# Patient Record
Sex: MALE | Race: WHITE | NOT HISPANIC OR LATINO | Employment: OTHER | URBAN - NONMETROPOLITAN AREA
[De-identification: names, ages, dates, MRNs, and addresses within clinical notes are randomized per-mention and may not be internally consistent; named-entity substitution may affect disease eponyms.]

---

## 2020-12-21 ENCOUNTER — APPOINTMENT (OUTPATIENT)
Dept: GENERAL RADIOLOGY | Facility: HOSPITAL | Age: 65
End: 2020-12-21

## 2020-12-21 ENCOUNTER — HOSPITAL ENCOUNTER (EMERGENCY)
Facility: HOSPITAL | Age: 65
Discharge: ANOTHER HOSPITAL | End: 2020-12-22
Attending: EMERGENCY MEDICINE | Admitting: EMERGENCY MEDICINE

## 2020-12-21 DIAGNOSIS — E13.10 DIABETIC KETOACIDOSIS WITHOUT COMA ASSOCIATED WITH OTHER SPECIFIED DIABETES MELLITUS (HCC): Primary | ICD-10-CM

## 2020-12-21 DIAGNOSIS — J18.9 PNEUMONIA OF BOTH LOWER LOBES DUE TO INFECTIOUS ORGANISM: ICD-10-CM

## 2020-12-21 LAB
A-A DO2: 129.3 MMHG (ref 0–300)
A-A DO2: 41.6 MMHG (ref 0–300)
A-A DO2: 49.6 MMHG (ref 0–300)
A-A DO2: 61.6 MMHG (ref 0–300)
ACETONE BLD QL: ABNORMAL
ALBUMIN SERPL-MCNC: 4.61 G/DL (ref 3.5–5.2)
ALBUMIN/GLOB SERPL: 1.5 G/DL
ALP SERPL-CCNC: 122 U/L (ref 39–117)
ALT SERPL W P-5'-P-CCNC: 15 U/L (ref 1–41)
AMYLASE SERPL-CCNC: 228 U/L (ref 28–100)
ANION GAP SERPL CALCULATED.3IONS-SCNC: 18.6 MMOL/L (ref 5–15)
ANION GAP SERPL CALCULATED.3IONS-SCNC: 19.1 MMOL/L (ref 5–15)
ANION GAP SERPL CALCULATED.3IONS-SCNC: 30.9 MMOL/L (ref 5–15)
ANION GAP SERPL CALCULATED.3IONS-SCNC: 38.2 MMOL/L (ref 5–15)
ARTERIAL PATENCY WRIST A: ABNORMAL
ARTERIAL PATENCY WRIST A: ABNORMAL
ARTERIAL PATENCY WRIST A: POSITIVE
ARTERIAL PATENCY WRIST A: POSITIVE
AST SERPL-CCNC: 19 U/L (ref 1–40)
ATMOSPHERIC PRESS: 722 MMHG
ATMOSPHERIC PRESS: 724 MMHG
ATMOSPHERIC PRESS: 725 MMHG
ATMOSPHERIC PRESS: 725 MMHG
BACTERIA UR QL AUTO: ABNORMAL /HPF
BASE EXCESS BLDA CALC-SCNC: -15.7 MMOL/L (ref 0–2)
BASE EXCESS BLDA CALC-SCNC: -25.2 MMOL/L (ref 0–2)
BASE EXCESS BLDA CALC-SCNC: -27.7 MMOL/L (ref 0–2)
BASE EXCESS BLDA CALC-SCNC: -30.8 MMOL/L (ref 0–2)
BASOPHILS # BLD AUTO: 0.1 10*3/MM3 (ref 0–0.2)
BASOPHILS NFR BLD AUTO: 1 % (ref 0–1.5)
BDY SITE: ABNORMAL
BILIRUB SERPL-MCNC: 0.3 MG/DL (ref 0–1.2)
BILIRUB UR QL STRIP: NEGATIVE
BODY TEMPERATURE: 0 C
BUN SERPL-MCNC: 50 MG/DL (ref 8–23)
BUN SERPL-MCNC: 50 MG/DL (ref 8–23)
BUN SERPL-MCNC: 51 MG/DL (ref 8–23)
BUN SERPL-MCNC: 53 MG/DL (ref 8–23)
BUN/CREAT SERPL: 14.5 (ref 7–25)
BUN/CREAT SERPL: 15 (ref 7–25)
BUN/CREAT SERPL: 16.4 (ref 7–25)
BUN/CREAT SERPL: 16.6 (ref 7–25)
CALCIUM SPEC-SCNC: 7.4 MG/DL (ref 8.6–10.5)
CALCIUM SPEC-SCNC: 7.7 MG/DL (ref 8.6–10.5)
CALCIUM SPEC-SCNC: 7.9 MG/DL (ref 8.6–10.5)
CALCIUM SPEC-SCNC: 8.6 MG/DL (ref 8.6–10.5)
CHLORIDE SERPL-SCNC: 105 MMOL/L (ref 98–107)
CHLORIDE SERPL-SCNC: 115 MMOL/L (ref 98–107)
CHLORIDE SERPL-SCNC: 117 MMOL/L (ref 98–107)
CHLORIDE SERPL-SCNC: 90 MMOL/L (ref 98–107)
CLARITY UR: ABNORMAL
CO2 BLDA-SCNC: 2.7 MMOL/L (ref 22–33)
CO2 BLDA-SCNC: 3.5 MMOL/L (ref 22–33)
CO2 BLDA-SCNC: 4.8 MMOL/L (ref 22–33)
CO2 BLDA-SCNC: 9.4 MMOL/L (ref 22–33)
CO2 SERPL-SCNC: 12.9 MMOL/L (ref 22–29)
CO2 SERPL-SCNC: 4.8 MMOL/L (ref 22–29)
CO2 SERPL-SCNC: 5.1 MMOL/L (ref 22–29)
CO2 SERPL-SCNC: 8.4 MMOL/L (ref 22–29)
COHGB MFR BLD: 0.9 % (ref 0–5)
COHGB MFR BLD: 0.9 % (ref 0–5)
COHGB MFR BLD: 1 % (ref 0–5)
COHGB MFR BLD: 1.1 % (ref 0–5)
COLOR UR: YELLOW
CREAT SERPL-MCNC: 3.04 MG/DL (ref 0.76–1.27)
CREAT SERPL-MCNC: 3.08 MG/DL (ref 0.76–1.27)
CREAT SERPL-MCNC: 3.34 MG/DL (ref 0.76–1.27)
CREAT SERPL-MCNC: 3.66 MG/DL (ref 0.76–1.27)
CRP SERPL-MCNC: 5.11 MG/DL (ref 0–0.5)
D-LACTATE SERPL-SCNC: 2.2 MMOL/L (ref 0.5–2)
D-LACTATE SERPL-SCNC: 3.4 MMOL/L (ref 0.5–2)
DEPRECATED RDW RBC AUTO: 48.3 FL (ref 37–54)
EOSINOPHIL # BLD AUTO: 0.04 10*3/MM3 (ref 0–0.4)
EOSINOPHIL NFR BLD AUTO: 0.4 % (ref 0.3–6.2)
ERYTHROCYTE [DISTWIDTH] IN BLOOD BY AUTOMATED COUNT: 13 % (ref 12.3–15.4)
FLUAV RNA RESP QL NAA+PROBE: NOT DETECTED
FLUBV RNA RESP QL NAA+PROBE: NOT DETECTED
GAS FLOW AIRWAY: 2 LPM
GFR SERPL CREATININE-BSD FRML MDRD: 17 ML/MIN/1.73
GFR SERPL CREATININE-BSD FRML MDRD: 19 ML/MIN/1.73
GFR SERPL CREATININE-BSD FRML MDRD: 21 ML/MIN/1.73
GFR SERPL CREATININE-BSD FRML MDRD: 21 ML/MIN/1.73
GLOBULIN UR ELPH-MCNC: 3 GM/DL
GLUCOSE BLDC GLUCOMTR-MCNC: 132 MG/DL (ref 70–130)
GLUCOSE BLDC GLUCOMTR-MCNC: 142 MG/DL (ref 70–130)
GLUCOSE BLDC GLUCOMTR-MCNC: 172 MG/DL (ref 70–130)
GLUCOSE BLDC GLUCOMTR-MCNC: 220 MG/DL (ref 70–130)
GLUCOSE BLDC GLUCOMTR-MCNC: 235 MG/DL (ref 70–130)
GLUCOSE BLDC GLUCOMTR-MCNC: 253 MG/DL (ref 70–130)
GLUCOSE BLDC GLUCOMTR-MCNC: 267 MG/DL (ref 70–130)
GLUCOSE BLDC GLUCOMTR-MCNC: 393 MG/DL (ref 70–130)
GLUCOSE BLDC GLUCOMTR-MCNC: 394 MG/DL (ref 70–130)
GLUCOSE BLDC GLUCOMTR-MCNC: 496 MG/DL (ref 70–130)
GLUCOSE BLDC GLUCOMTR-MCNC: 596 MG/DL (ref 70–130)
GLUCOSE BLDC GLUCOMTR-MCNC: 89 MG/DL (ref 70–130)
GLUCOSE BLDC GLUCOMTR-MCNC: >599 MG/DL (ref 70–130)
GLUCOSE SERPL-MCNC: 132 MG/DL (ref 65–99)
GLUCOSE SERPL-MCNC: 181 MG/DL (ref 65–99)
GLUCOSE SERPL-MCNC: 594 MG/DL (ref 65–99)
GLUCOSE SERPL-MCNC: 890 MG/DL (ref 65–99)
GLUCOSE UR STRIP-MCNC: ABNORMAL MG/DL
HCO3 BLDA-SCNC: 2.3 MMOL/L (ref 20–26)
HCO3 BLDA-SCNC: 3 MMOL/L (ref 20–26)
HCO3 BLDA-SCNC: 4.3 MMOL/L (ref 20–26)
HCO3 BLDA-SCNC: 8.8 MMOL/L (ref 20–26)
HCT VFR BLD AUTO: 47 % (ref 37.5–51)
HCT VFR BLD CALC: 37.4 % (ref 38–51)
HCT VFR BLD CALC: 37.9 % (ref 38–51)
HCT VFR BLD CALC: 39.7 % (ref 38–51)
HCT VFR BLD CALC: 43.3 % (ref 38–51)
HGB BLD-MCNC: 14 G/DL (ref 13–17.7)
HGB BLDA-MCNC: 12.2 G/DL (ref 14–18)
HGB BLDA-MCNC: 12.4 G/DL (ref 14–18)
HGB BLDA-MCNC: 13 G/DL (ref 14–18)
HGB BLDA-MCNC: 14.1 G/DL (ref 14–18)
HGB UR QL STRIP.AUTO: ABNORMAL
HOLD SPECIMEN: NORMAL
HOLD SPECIMEN: NORMAL
HYALINE CASTS UR QL AUTO: ABNORMAL /LPF
IMM GRANULOCYTES # BLD AUTO: 0.04 10*3/MM3 (ref 0–0.05)
IMM GRANULOCYTES NFR BLD AUTO: 0.4 % (ref 0–0.5)
INHALED O2 CONCENTRATION: 21 %
INHALED O2 CONCENTRATION: 28 %
KETONES UR QL STRIP: ABNORMAL
LACTATE HOLD SPECIMEN: NORMAL
LEUKOCYTE ESTERASE UR QL STRIP.AUTO: NEGATIVE
LIPASE SERPL-CCNC: 431 U/L (ref 13–60)
LYMPHOCYTES # BLD AUTO: 1.48 10*3/MM3 (ref 0.7–3.1)
LYMPHOCYTES NFR BLD AUTO: 14.9 % (ref 19.6–45.3)
Lab: ABNORMAL
MAGNESIUM SERPL-MCNC: 3.3 MG/DL (ref 1.6–2.4)
MCH RBC QN AUTO: 29.8 PG (ref 26.6–33)
MCHC RBC AUTO-ENTMCNC: 29.8 G/DL (ref 31.5–35.7)
MCV RBC AUTO: 100 FL (ref 79–97)
METHGB BLD QL: 0.3 % (ref 0–3)
METHGB BLD QL: 0.6 % (ref 0–3)
METHGB BLD QL: 0.6 % (ref 0–3)
METHGB BLD QL: 0.8 % (ref 0–3)
MODALITY: ABNORMAL
MONOCYTES # BLD AUTO: 0.58 10*3/MM3 (ref 0.1–0.9)
MONOCYTES NFR BLD AUTO: 5.8 % (ref 5–12)
NEUTROPHILS NFR BLD AUTO: 7.68 10*3/MM3 (ref 1.7–7)
NEUTROPHILS NFR BLD AUTO: 77.5 % (ref 42.7–76)
NITRITE UR QL STRIP: NEGATIVE
NOTE: ABNORMAL
NOTIFIED BY: ABNORMAL
NOTIFIED WHO: ABNORMAL
NRBC BLD AUTO-RTO: 0 /100 WBC (ref 0–0.2)
OXYHGB MFR BLDV: 79.4 % (ref 94–99)
OXYHGB MFR BLDV: 86.7 % (ref 94–99)
OXYHGB MFR BLDV: 90.9 % (ref 94–99)
OXYHGB MFR BLDV: 91 % (ref 94–99)
PCO2 BLDA: 13.9 MM HG (ref 35–45)
PCO2 BLDA: 14.2 MM HG (ref 35–45)
PCO2 BLDA: 17.1 MM HG (ref 35–45)
PCO2 BLDA: 18.9 MM HG (ref 35–45)
PCO2 TEMP ADJ BLD: ABNORMAL MM[HG]
PH BLDA: 6.83 PH UNITS (ref 7.35–7.45)
PH BLDA: 6.94 PH UNITS (ref 7.35–7.45)
PH BLDA: 7.01 PH UNITS (ref 7.35–7.45)
PH BLDA: 7.28 PH UNITS (ref 7.35–7.45)
PH UR STRIP.AUTO: <=5 [PH] (ref 5–8)
PH, TEMP CORRECTED: ABNORMAL
PHOSPHATE SERPL-MCNC: 10.9 MG/DL (ref 2.5–4.5)
PLATELET # BLD AUTO: 292 10*3/MM3 (ref 140–450)
PMV BLD AUTO: 12.5 FL (ref 6–12)
PO2 BLDA: 39.4 MM HG (ref 83–108)
PO2 BLDA: 61 MM HG (ref 83–108)
PO2 BLDA: 76.7 MM HG (ref 83–108)
PO2 BLDA: 85.1 MM HG (ref 83–108)
PO2 TEMP ADJ BLD: ABNORMAL MM[HG]
POTASSIUM SERPL-SCNC: 2.6 MMOL/L (ref 3.5–5.2)
POTASSIUM SERPL-SCNC: 2.7 MMOL/L (ref 3.5–5.2)
POTASSIUM SERPL-SCNC: 3.7 MMOL/L (ref 3.5–5.2)
POTASSIUM SERPL-SCNC: 5.2 MMOL/L (ref 3.5–5.2)
PROT SERPL-MCNC: 7.6 G/DL (ref 6–8.5)
PROT UR QL STRIP: ABNORMAL
RBC # BLD AUTO: 4.7 10*6/MM3 (ref 4.14–5.8)
RBC # UR: ABNORMAL /HPF
REF LAB TEST METHOD: ABNORMAL
SAO2 % BLDCOA: 80.5 % (ref 94–99)
SAO2 % BLDCOA: 88.1 % (ref 94–99)
SAO2 % BLDCOA: 92.3 % (ref 94–99)
SAO2 % BLDCOA: 92.6 % (ref 94–99)
SARS-COV-2 RNA RESP QL NAA+PROBE: NOT DETECTED
SODIUM SERPL-SCNC: 133 MMOL/L (ref 136–145)
SODIUM SERPL-SCNC: 141 MMOL/L (ref 136–145)
SODIUM SERPL-SCNC: 144 MMOL/L (ref 136–145)
SODIUM SERPL-SCNC: 147 MMOL/L (ref 136–145)
SP GR UR STRIP: 1.02 (ref 1–1.03)
SQUAMOUS #/AREA URNS HPF: ABNORMAL /HPF
TROPONIN T SERPL-MCNC: 0.01 NG/ML (ref 0–0.03)
TSH SERPL DL<=0.05 MIU/L-ACNC: 1.53 UIU/ML (ref 0.27–4.2)
UROBILINOGEN UR QL STRIP: ABNORMAL
VENTILATOR MODE: ABNORMAL
WBC # BLD AUTO: 9.92 10*3/MM3 (ref 3.4–10.8)
WBC UR QL AUTO: ABNORMAL /HPF
WHOLE BLOOD HOLD SPECIMEN: NORMAL
WHOLE BLOOD HOLD SPECIMEN: NORMAL

## 2020-12-21 PROCEDURE — 82009 KETONE BODYS QUAL: CPT | Performed by: EMERGENCY MEDICINE

## 2020-12-21 PROCEDURE — 99285 EMERGENCY DEPT VISIT HI MDM: CPT

## 2020-12-21 PROCEDURE — 71045 X-RAY EXAM CHEST 1 VIEW: CPT

## 2020-12-21 PROCEDURE — 71045 X-RAY EXAM CHEST 1 VIEW: CPT | Performed by: RADIOLOGY

## 2020-12-21 PROCEDURE — C9803 HOPD COVID-19 SPEC COLLECT: HCPCS

## 2020-12-21 PROCEDURE — 96367 TX/PROPH/DG ADDL SEQ IV INF: CPT

## 2020-12-21 PROCEDURE — 25010000002 PIPERACILLIN SOD-TAZOBACTAM PER 1 G: Performed by: EMERGENCY MEDICINE

## 2020-12-21 PROCEDURE — 84443 ASSAY THYROID STIM HORMONE: CPT | Performed by: EMERGENCY MEDICINE

## 2020-12-21 PROCEDURE — 83050 HGB METHEMOGLOBIN QUAN: CPT

## 2020-12-21 PROCEDURE — 96366 THER/PROPH/DIAG IV INF ADDON: CPT

## 2020-12-21 PROCEDURE — 25010000002 MIDAZOLAM PER 1MG: Performed by: EMERGENCY MEDICINE

## 2020-12-21 PROCEDURE — 86140 C-REACTIVE PROTEIN: CPT | Performed by: EMERGENCY MEDICINE

## 2020-12-21 PROCEDURE — 82962 GLUCOSE BLOOD TEST: CPT

## 2020-12-21 PROCEDURE — 87040 BLOOD CULTURE FOR BACTERIA: CPT | Performed by: EMERGENCY MEDICINE

## 2020-12-21 PROCEDURE — 82805 BLOOD GASES W/O2 SATURATION: CPT

## 2020-12-21 PROCEDURE — 93005 ELECTROCARDIOGRAM TRACING: CPT | Performed by: EMERGENCY MEDICINE

## 2020-12-21 PROCEDURE — 36415 COLL VENOUS BLD VENIPUNCTURE: CPT

## 2020-12-21 PROCEDURE — 96376 TX/PRO/DX INJ SAME DRUG ADON: CPT

## 2020-12-21 PROCEDURE — 96375 TX/PRO/DX INJ NEW DRUG ADDON: CPT

## 2020-12-21 PROCEDURE — 36600 WITHDRAWAL OF ARTERIAL BLOOD: CPT

## 2020-12-21 PROCEDURE — 87636 SARSCOV2 & INF A&B AMP PRB: CPT | Performed by: EMERGENCY MEDICINE

## 2020-12-21 PROCEDURE — 80053 COMPREHEN METABOLIC PANEL: CPT | Performed by: EMERGENCY MEDICINE

## 2020-12-21 PROCEDURE — 63710000001 INSULIN REGULAR HUMAN PER 5 UNITS: Performed by: EMERGENCY MEDICINE

## 2020-12-21 PROCEDURE — 51702 INSERT TEMP BLADDER CATH: CPT

## 2020-12-21 PROCEDURE — 82375 ASSAY CARBOXYHB QUANT: CPT

## 2020-12-21 PROCEDURE — 25010000003 POTASSIUM CHLORIDE 10 MEQ/100ML SOLUTION: Performed by: STUDENT IN AN ORGANIZED HEALTH CARE EDUCATION/TRAINING PROGRAM

## 2020-12-21 PROCEDURE — 25010000002 VANCOMYCIN 5 G RECONSTITUTED SOLUTION: Performed by: EMERGENCY MEDICINE

## 2020-12-21 PROCEDURE — 83605 ASSAY OF LACTIC ACID: CPT | Performed by: EMERGENCY MEDICINE

## 2020-12-21 PROCEDURE — 96365 THER/PROPH/DIAG IV INF INIT: CPT

## 2020-12-21 PROCEDURE — 80048 BASIC METABOLIC PNL TOTAL CA: CPT | Performed by: EMERGENCY MEDICINE

## 2020-12-21 PROCEDURE — 84100 ASSAY OF PHOSPHORUS: CPT | Performed by: EMERGENCY MEDICINE

## 2020-12-21 PROCEDURE — C1751 CATH, INF, PER/CENT/MIDLINE: HCPCS

## 2020-12-21 PROCEDURE — 83735 ASSAY OF MAGNESIUM: CPT | Performed by: EMERGENCY MEDICINE

## 2020-12-21 PROCEDURE — 80048 BASIC METABOLIC PNL TOTAL CA: CPT | Performed by: STUDENT IN AN ORGANIZED HEALTH CARE EDUCATION/TRAINING PROGRAM

## 2020-12-21 PROCEDURE — 96368 THER/DIAG CONCURRENT INF: CPT

## 2020-12-21 PROCEDURE — 81001 URINALYSIS AUTO W/SCOPE: CPT | Performed by: EMERGENCY MEDICINE

## 2020-12-21 PROCEDURE — 93010 ELECTROCARDIOGRAM REPORT: CPT | Performed by: INTERNAL MEDICINE

## 2020-12-21 PROCEDURE — 84484 ASSAY OF TROPONIN QUANT: CPT | Performed by: EMERGENCY MEDICINE

## 2020-12-21 PROCEDURE — 82150 ASSAY OF AMYLASE: CPT | Performed by: EMERGENCY MEDICINE

## 2020-12-21 PROCEDURE — 83690 ASSAY OF LIPASE: CPT | Performed by: EMERGENCY MEDICINE

## 2020-12-21 PROCEDURE — 85025 COMPLETE CBC W/AUTO DIFF WBC: CPT | Performed by: EMERGENCY MEDICINE

## 2020-12-21 RX ORDER — SODIUM CHLORIDE 0.9 % (FLUSH) 0.9 %
10 SYRINGE (ML) INJECTION AS NEEDED
Status: DISCONTINUED | OUTPATIENT
Start: 2020-12-21 | End: 2020-12-22 | Stop reason: HOSPADM

## 2020-12-21 RX ORDER — POTASSIUM CHLORIDE 7.45 MG/ML
10 INJECTION INTRAVENOUS ONCE
Status: COMPLETED | OUTPATIENT
Start: 2020-12-21 | End: 2020-12-22

## 2020-12-21 RX ORDER — DEXTROSE MONOHYDRATE 25 G/50ML
25-50 INJECTION, SOLUTION INTRAVENOUS
Status: DISCONTINUED | OUTPATIENT
Start: 2020-12-21 | End: 2020-12-22 | Stop reason: HOSPADM

## 2020-12-21 RX ORDER — GLYBURIDE 5 MG/1
5 TABLET ORAL
COMMUNITY

## 2020-12-21 RX ORDER — POTASSIUM CHLORIDE 7.45 MG/ML
10 INJECTION INTRAVENOUS ONCE
Status: COMPLETED | OUTPATIENT
Start: 2020-12-21 | End: 2020-12-21

## 2020-12-21 RX ORDER — SODIUM CHLORIDE 9 MG/ML
150 INJECTION, SOLUTION INTRAVENOUS CONTINUOUS
Status: DISCONTINUED | OUTPATIENT
Start: 2020-12-21 | End: 2020-12-22 | Stop reason: HOSPADM

## 2020-12-21 RX ORDER — MIDAZOLAM HYDROCHLORIDE 1 MG/ML
4 INJECTION INTRAMUSCULAR; INTRAVENOUS ONCE
Status: COMPLETED | OUTPATIENT
Start: 2020-12-21 | End: 2020-12-21

## 2020-12-21 RX ORDER — DEXTROSE AND SODIUM CHLORIDE 5; .9 G/100ML; G/100ML
125 INJECTION, SOLUTION INTRAVENOUS CONTINUOUS
Status: DISCONTINUED | OUTPATIENT
Start: 2020-12-21 | End: 2020-12-22 | Stop reason: HOSPADM

## 2020-12-21 RX ORDER — NOREPINEPHRINE BIT/0.9 % NACL 8 MG/250ML
.02-.3 INFUSION BOTTLE (ML) INTRAVENOUS
Status: DISCONTINUED | OUTPATIENT
Start: 2020-12-21 | End: 2020-12-22 | Stop reason: HOSPADM

## 2020-12-21 RX ORDER — EMPAGLIFLOZIN AND METFORMIN HYDROCHLORIDE 12.5; 1 MG/1; MG/1
TABLET ORAL 2 TIMES DAILY
COMMUNITY

## 2020-12-21 RX ORDER — POTASSIUM CHLORIDE 1.5 G/1.77G
40 POWDER, FOR SOLUTION ORAL ONCE
Status: COMPLETED | OUTPATIENT
Start: 2020-12-21 | End: 2020-12-21

## 2020-12-21 RX ORDER — PANTOPRAZOLE SODIUM 40 MG/1
40 TABLET, DELAYED RELEASE ORAL DAILY
COMMUNITY

## 2020-12-21 RX ORDER — POTASSIUM CHLORIDE 7.45 MG/ML
10 INJECTION INTRAVENOUS ONCE
Status: DISCONTINUED | OUTPATIENT
Start: 2020-12-21 | End: 2020-12-22 | Stop reason: HOSPADM

## 2020-12-21 RX ORDER — POTASSIUM CHLORIDE 1.5 G/1.77G
20 POWDER, FOR SOLUTION ORAL ONCE
Status: DISCONTINUED | OUTPATIENT
Start: 2020-12-21 | End: 2020-12-21

## 2020-12-21 RX ORDER — LISINOPRIL 2.5 MG/1
2.5 TABLET ORAL DAILY
COMMUNITY

## 2020-12-21 RX ORDER — ASPIRIN 81 MG/1
81 TABLET, CHEWABLE ORAL DAILY
COMMUNITY

## 2020-12-21 RX ADMIN — SODIUM CHLORIDE 1000 ML: 9 INJECTION, SOLUTION INTRAVENOUS at 13:59

## 2020-12-21 RX ADMIN — POTASSIUM CHLORIDE 40 MEQ: 1.5 POWDER, FOR SOLUTION ORAL at 23:30

## 2020-12-21 RX ADMIN — SODIUM CHLORIDE 1000 ML: 9 INJECTION, SOLUTION INTRAVENOUS at 12:27

## 2020-12-21 RX ADMIN — NOREPINEPHRINE BITARTRATE 0.02 MCG/KG/MIN: 1 INJECTION, SOLUTION, CONCENTRATE INTRAVENOUS at 18:22

## 2020-12-21 RX ADMIN — VANCOMYCIN HYDROCHLORIDE 1500 MG: 5 INJECTION, POWDER, LYOPHILIZED, FOR SOLUTION INTRAVENOUS at 13:02

## 2020-12-21 RX ADMIN — SODIUM CHLORIDE 150 ML/HR: 9 INJECTION, SOLUTION INTRAVENOUS at 11:00

## 2020-12-21 RX ADMIN — INSULIN HUMAN 5 UNITS/HR: 1 INJECTION, SOLUTION INTRAVENOUS at 11:11

## 2020-12-21 RX ADMIN — DEXTROSE AND SODIUM CHLORIDE 125 ML/HR: 5; 900 INJECTION, SOLUTION INTRAVENOUS at 21:19

## 2020-12-21 RX ADMIN — POTASSIUM CHLORIDE 10 MEQ: 10 INJECTION, SOLUTION INTRAVENOUS at 21:26

## 2020-12-21 RX ADMIN — HUMAN INSULIN 5 UNITS: 100 INJECTION, SOLUTION SUBCUTANEOUS at 10:54

## 2020-12-21 RX ADMIN — POTASSIUM CHLORIDE 10 MEQ: 10 INJECTION, SOLUTION INTRAVENOUS at 22:29

## 2020-12-21 RX ADMIN — SODIUM CHLORIDE 2313 ML: 9 INJECTION, SOLUTION INTRAVENOUS at 11:00

## 2020-12-21 RX ADMIN — PIPERACILLIN SODIUM AND TAZOBACTAM SODIUM 3.38 G: 3; .375 INJECTION, POWDER, LYOPHILIZED, FOR SOLUTION INTRAVENOUS at 12:27

## 2020-12-21 RX ADMIN — MIDAZOLAM HYDROCHLORIDE 0.5 MG: 1 INJECTION, SOLUTION INTRAMUSCULAR; INTRAVENOUS at 18:10

## 2020-12-21 RX ADMIN — POTASSIUM CHLORIDE 10 MEQ: 10 INJECTION, SOLUTION INTRAVENOUS at 23:42

## 2020-12-21 RX ADMIN — SODIUM BICARBONATE 50 MEQ: 84 INJECTION, SOLUTION INTRAVENOUS at 10:58

## 2020-12-21 NOTE — ED NOTES
Checked blood sugar, continues to read high. Dr. Maritza okeefe.      Sourav Ruiz, RN  12/21/20 9259

## 2020-12-21 NOTE — ED PROVIDER NOTES
Subjective   Patient is a 65-year-old male who presents to the emergency department severely acutely ill in diabetic ketoacidosis.  He is awake, he is oriented.  He states he has had diarrhea for several days, some vomiting.  He denies any sort of pain.  The acuity of his condition does not allow him to speak much more and provide much more history.  Sister does not know much about the acute illness, she states he moved here approximately 6 months ago from [place], has not yet established himself with a local physician.  He has never been to this facility, we have no old records on file.          Review of Systems   Unable to perform ROS: Acuity of condition       No past medical history on file.    No Known Allergies    No past surgical history on file.    No family history on file.    Social History     Socioeconomic History   • Marital status:      Spouse name: Not on file   • Number of children: Not on file   • Years of education: Not on file   • Highest education level: Not on file           Objective   Physical Exam  Vitals signs and nursing note reviewed.   Constitutional:       General: He is in acute distress.      Appearance: He is ill-appearing.      Comments: Well-developed well-nourished male, awake, oriented.  He appears severely acutely ill.  He exhibits Kussmaul's respirations.   HENT:      Head: Normocephalic and atraumatic.   Eyes:      General: No scleral icterus.     Pupils: Pupils are equal, round, and reactive to light.   Neck:      Musculoskeletal: Normal range of motion and neck supple. No neck rigidity.      Trachea: No tracheal deviation.   Cardiovascular:      Rate and Rhythm: Regular rhythm.   Pulmonary:      Effort: No respiratory distress.      Breath sounds: Normal breath sounds.   Chest:      Chest wall: No tenderness.   Abdominal:      General: Bowel sounds are normal.      Palpations: Abdomen is soft.      Tenderness: There is no abdominal tenderness. There is no guarding or  rebound.   Musculoskeletal: Normal range of motion.         General: No tenderness.   Skin:     General: Skin is warm and dry.      Capillary Refill: Capillary refill takes less than 2 seconds.      Coloration: Skin is not pale.   Neurological:      General: No focal deficit present.      Mental Status: He is oriented to person, place, and time.      GCS: GCS eye subscore is 4. GCS verbal subscore is 5. GCS motor subscore is 6.   Psychiatric:         Behavior: Behavior normal.         Central Line At Bedside    Date/Time: 12/21/2020 6:28 PM  Performed by: Elie Salas MD  Authorized by: Sathya Esparza DO     Consent:     Consent obtained:  Verbal    Consent given by:  Patient  Pre-procedure details:     Hand hygiene: Hand hygiene performed prior to insertion      Sterile barrier technique: All elements of maximal sterile technique followed      Skin preparation:  2% chlorhexidine    Skin preparation agent: Skin preparation agent completely dried prior to procedure    Sedation:     Sedation type:  Anxiolysis (0.5 mg Versed IV)  Anesthesia (see MAR for exact dosages):     Anesthesia method:  Local infiltration    Local anesthetic:  Lidocaine 1% w/o epi  Procedure details:     Location:  R subclavian    Patient position:  Trendelenburg    Procedural supplies:  Triple lumen    Catheter size:  7 Fr    Number of attempts:  1    Successful placement: yes    Post-procedure details:     Post-procedure:  Dressing applied    Assessment:  Blood return through all ports, free fluid flow, no pneumothorax on x-ray and placement verified by x-ray    Patient tolerance of procedure:  Tolerated well, no immediate complications      EKG shows sinus rhythm with a rate of 72.  There is baseline artifact.  Nonspecific ST-T changes.  No apparent acute ischemia.  No evidence for STEMI.  XR Chest 1 View   Final Result   1.  Interval placement of right subclavian deep line.   2.  No pneumothorax.   3.  Significant worsening and/or  development of consolidations bilateral   lower lungs.       This report was finalized on 12/21/2020 6:02 PM by Dr. Laz Ferguson MD.          XR Chest 1 View   Final Result     Left lower lobe airspace disease.       This report was finalized on 12/21/2020 11:23 AM by Dr. Johnnie Lal MD.              /  Results for orders placed or performed during the hospital encounter of 12/21/20   Blood Culture - Blood, Arm, Right    Specimen: Arm, Right; Blood   Result Value Ref Range    Blood Culture No growth at 4 days    Blood Culture - Blood, Arm, Left    Specimen: Arm, Left; Blood   Result Value Ref Range    Blood Culture No growth at 4 days    COVID-19 and FLU A/B PCR - Swab, Nasopharynx    Specimen: Nasopharynx; Swab   Result Value Ref Range    COVID19 Not Detected Not Detected - Ref. Range    Influenza A PCR Not Detected Not Detected    Influenza B PCR Not Detected Not Detected   Blood Gas, Arterial With Co-Ox    Specimen: Arterial Blood   Result Value Ref Range    Site Left Brachial     Bruce's Test N/A     pH, Arterial 6.830 (C) 7.350 - 7.450 pH units    pCO2, Arterial 13.9 (C) 35.0 - 45.0 mm Hg    pO2, Arterial 85.1 83.0 - 108.0 mm Hg    HCO3, Arterial 2.3 (L) 20.0 - 26.0 mmol/L    Base Excess, Arterial -30.8 (L) 0.0 - 2.0 mmol/L    O2 Saturation, Arterial 92.6 (L) 94.0 - 99.0 %    Hemoglobin, Blood Gas 14.1 14 - 18 g/dL    Hematocrit, Blood Gas 43.3 38.0 - 51.0 %    Oxyhemoglobin 91.0 (L) 94 - 99 %    Methemoglobin 0.80 0.00 - 3.00 %    Carboxyhemoglobin 0.9 0 - 5 %    A-a Gradiant 41.6 0.0 - 300.0 mmHg    CO2 Content 2.7 (L) 22 - 33 mmol/L    Temperature 0.0 C    Barometric Pressure for Blood Gas 725 mmHg    Modality Room Air     FIO2 21 %    Ventilator Mode NA     Note      Notified Who DR SYLVESTER AND JESUS RN, ER     Notified By [id number]     Notified Time 12/21/2020 10:46     Collected by [id number]     pH, Temp Corrected      pCO2, Temperature Corrected      pO2, Temperature Corrected     Comprehensive  Metabolic Panel    Specimen: Blood   Result Value Ref Range    Glucose 890 (C) 65 - 99 mg/dL    BUN 53 (H) 8 - 23 mg/dL    Creatinine 3.66 (H) 0.76 - 1.27 mg/dL    Sodium 133 (L) 136 - 145 mmol/L    Potassium 5.2 3.5 - 5.2 mmol/L    Chloride 90 (L) 98 - 107 mmol/L    CO2 4.8 (L) 22.0 - 29.0 mmol/L    Calcium 8.6 8.6 - 10.5 mg/dL    Total Protein 7.6 6.0 - 8.5 g/dL    Albumin 4.61 3.50 - 5.20 g/dL    ALT (SGPT) 15 1 - 41 U/L    AST (SGOT) 19 1 - 40 U/L    Alkaline Phosphatase 122 (H) 39 - 117 U/L    Total Bilirubin 0.3 0.0 - 1.2 mg/dL    eGFR Non African Amer 17 (L) >60 mL/min/1.73    Globulin 3.0 gm/dL    A/G Ratio 1.5 g/dL    BUN/Creatinine Ratio 14.5 7.0 - 25.0    Anion Gap 38.2 (H) 5.0 - 15.0 mmol/L   Lipase    Specimen: Blood   Result Value Ref Range    Lipase 431 (H) 13 - 60 U/L   Amylase    Specimen: Blood   Result Value Ref Range    Amylase 228 (H) 28 - 100 U/L   Urinalysis With Culture If Indicated - Urine, Catheter In/Out    Specimen: Urine, Catheter In/Out   Result Value Ref Range    Color, UA Yellow Yellow, Straw    Appearance, UA Cloudy (A) Clear    pH, UA <=5.0 5.0 - 8.0    Specific Gravity, UA 1.021 1.005 - 1.030    Glucose, UA >=1000 mg/dL (3+) (A) Negative    Ketones, UA 80 mg/dL (3+) (A) Negative    Bilirubin, UA Negative Negative    Blood, UA Moderate (2+) (A) Negative    Protein,  mg/dL (2+) (A) Negative    Leuk Esterase, UA Negative Negative    Nitrite, UA Negative Negative    Urobilinogen, UA 0.2 E.U./dL 0.2 - 1.0 E.U./dL   Troponin    Specimen: Blood   Result Value Ref Range    Troponin T 0.015 0.000 - 0.030 ng/mL   Lactic Acid, Plasma    Specimen: Arm, Left; Blood   Result Value Ref Range    Lactate 3.4 (C) 0.5 - 2.0 mmol/L   C-reactive Protein    Specimen: Blood   Result Value Ref Range    C-Reactive Protein 5.11 (H) 0.00 - 0.50 mg/dL   TSH    Specimen: Blood   Result Value Ref Range    TSH 1.530 0.270 - 4.200 uIU/mL   Magnesium    Specimen: Blood   Result Value Ref Range    Magnesium  3.3 (H) 1.6 - 2.4 mg/dL   Phosphorus    Specimen: Blood   Result Value Ref Range    Phosphorus 10.9 (H) 2.5 - 4.5 mg/dL   Acetone    Specimen: Blood   Result Value Ref Range    Acetone Moderate (C) Negative   CBC Auto Differential    Specimen: Blood   Result Value Ref Range    WBC 9.92 3.40 - 10.80 10*3/mm3    RBC 4.70 4.14 - 5.80 10*6/mm3    Hemoglobin 14.0 13.0 - 17.7 g/dL    Hematocrit 47.0 37.5 - 51.0 %    .0 (H) 79.0 - 97.0 fL    MCH 29.8 26.6 - 33.0 pg    MCHC 29.8 (L) 31.5 - 35.7 g/dL    RDW 13.0 12.3 - 15.4 %    RDW-SD 48.3 37.0 - 54.0 fl    MPV 12.5 (H) 6.0 - 12.0 fL    Platelets 292 140 - 450 10*3/mm3    Neutrophil % 77.5 (H) 42.7 - 76.0 %    Lymphocyte % 14.9 (L) 19.6 - 45.3 %    Monocyte % 5.8 5.0 - 12.0 %    Eosinophil % 0.4 0.3 - 6.2 %    Basophil % 1.0 0.0 - 1.5 %    Immature Grans % 0.4 0.0 - 0.5 %    Neutrophils, Absolute 7.68 (H) 1.70 - 7.00 10*3/mm3    Lymphocytes, Absolute 1.48 0.70 - 3.10 10*3/mm3    Monocytes, Absolute 0.58 0.10 - 0.90 10*3/mm3    Eosinophils, Absolute 0.04 0.00 - 0.40 10*3/mm3    Basophils, Absolute 0.10 0.00 - 0.20 10*3/mm3    Immature Grans, Absolute 0.04 0.00 - 0.05 10*3/mm3    nRBC 0.0 0.0 - 0.2 /100 WBC   Lactic Acid, Reflex Timer (This will reflex a repeat order 3-3:15 hours after ordered.)    Specimen: Arm, Left; Blood   Result Value Ref Range    Hold Tube Hold for add-ons.    Urinalysis, Microscopic Only - Urine, Catheter In/Out    Specimen: Urine, Catheter In/Out   Result Value Ref Range    RBC, UA 0-2 None Seen, 0-2 /HPF    WBC, UA 0-2 None Seen, 0-2 /HPF    Bacteria, UA Trace (A) None Seen /HPF    Squamous Epithelial Cells, UA 0-2 None Seen, 0-2 /HPF    Hyaline Casts, UA None Seen None Seen /LPF    Methodology Manual Light Microscopy    Blood Gas, Arterial With Co-Ox    Specimen: Arterial Blood   Result Value Ref Range    Site Left Brachial     Bruce's Test N/A     pH, Arterial 6.938 (C) 7.350 - 7.450 pH units    pCO2, Arterial 14.2 (C) 35.0 - 45.0 mm Hg     pO2, Arterial 76.7 (L) 83.0 - 108.0 mm Hg    HCO3, Arterial 3.0 (L) 20.0 - 26.0 mmol/L    Base Excess, Arterial -27.7 (L) 0.0 - 2.0 mmol/L    O2 Saturation, Arterial 92.3 (L) 94.0 - 99.0 %    Hemoglobin, Blood Gas 12.4 (L) 14 - 18 g/dL    Hematocrit, Blood Gas 37.9 (L) 38.0 - 51.0 %    Oxyhemoglobin 90.9 (L) 94 - 99 %    Methemoglobin 0.60 0.00 - 3.00 %    Carboxyhemoglobin 0.9 0 - 5 %    A-a Gradiant 49.6 0.0 - 300.0 mmHg    CO2 Content 3.5 (L) 22 - 33 mmol/L    Temperature 0.0 C    Barometric Pressure for Blood Gas 724 mmHg    Modality Room Air     FIO2 21 %    Ventilator Mode NA     Note      Notified Who DR SYLVESTER AND JESUS RN, ER     Notified By [id number]     Notified Time 12/21/2020 12:11     Collected by [id number]     pH, Temp Corrected      pCO2, Temperature Corrected      pO2, Temperature Corrected     Basic Metabolic Panel    Specimen: Arm, Left; Blood   Result Value Ref Range    Glucose 594 (C) 65 - 99 mg/dL    BUN 50 (H) 8 - 23 mg/dL    Creatinine 3.34 (H) 0.76 - 1.27 mg/dL    Sodium 141 136 - 145 mmol/L    Potassium 3.7 3.5 - 5.2 mmol/L    Chloride 105 98 - 107 mmol/L    CO2 5.1 (L) 22.0 - 29.0 mmol/L    Calcium 7.4 (L) 8.6 - 10.5 mg/dL    eGFR Non African Amer 19 (L) >60 mL/min/1.73    BUN/Creatinine Ratio 15.0 7.0 - 25.0    Anion Gap 30.9 (H) 5.0 - 15.0 mmol/L   Blood Gas, Arterial With Co-Ox    Specimen: Arterial Blood   Result Value Ref Range    Site Left Radial     Bruce's Test Positive     pH, Arterial 7.009 (C) 7.350 - 7.450 pH units    pCO2, Arterial 17.1 (C) 35.0 - 45.0 mm Hg    pO2, Arterial 61.0 (L) 83.0 - 108.0 mm Hg    HCO3, Arterial 4.3 (L) 20.0 - 26.0 mmol/L    Base Excess, Arterial -25.2 (L) 0.0 - 2.0 mmol/L    O2 Saturation, Arterial 88.1 (L) 94.0 - 99.0 %    Hemoglobin, Blood Gas 12.2 (L) 14 - 18 g/dL    Hematocrit, Blood Gas 37.4 (L) 38.0 - 51.0 %    Oxyhemoglobin 86.7 (L) 94 - 99 %    Methemoglobin 0.60 0.00 - 3.00 %    Carboxyhemoglobin 1.0 0 - 5 %    A-a Gradiant 61.6 0.0 - 300.0 mmHg     CO2 Content 4.8 (L) 22 - 33 mmol/L    Temperature 0.0 C    Barometric Pressure for Blood Gas 722 mmHg    Modality Room Air     FIO2 21 %    Ventilator Mode NA     Note      Notified Who DR BOYD RN, ER     Notified By [id number]     Notified Time 12/21/2020 14:01     Collected by [id number]     pH, Temp Corrected      pCO2, Temperature Corrected      pO2, Temperature Corrected     Lactic Acid, Reflex    Specimen: Arm, Right; Blood   Result Value Ref Range    Lactate 2.2 (C) 0.5 - 2.0 mmol/L   Basic Metabolic Panel    Specimen: Blood   Result Value Ref Range    Glucose 181 (H) 65 - 99 mg/dL    BUN 51 (H) 8 - 23 mg/dL    Creatinine 3.08 (H) 0.76 - 1.27 mg/dL    Sodium 144 136 - 145 mmol/L    Potassium 2.7 (L) 3.5 - 5.2 mmol/L    Chloride 117 (H) 98 - 107 mmol/L    CO2 8.4 (L) 22.0 - 29.0 mmol/L    Calcium 7.7 (L) 8.6 - 10.5 mg/dL    eGFR Non African Amer 21 (L) >60 mL/min/1.73    BUN/Creatinine Ratio 16.6 7.0 - 25.0    Anion Gap 18.6 (H) 5.0 - 15.0 mmol/L   Basic Metabolic Panel    Specimen: Blood   Result Value Ref Range    Glucose 132 (H) 65 - 99 mg/dL    BUN 50 (H) 8 - 23 mg/dL    Creatinine 3.04 (H) 0.76 - 1.27 mg/dL    Sodium 147 (H) 136 - 145 mmol/L    Potassium 2.6 (C) 3.5 - 5.2 mmol/L    Chloride 115 (H) 98 - 107 mmol/L    CO2 12.9 (L) 22.0 - 29.0 mmol/L    Calcium 7.9 (L) 8.6 - 10.5 mg/dL    eGFR Non African Amer 21 (L) >60 mL/min/1.73    BUN/Creatinine Ratio 16.4 7.0 - 25.0    Anion Gap 19.1 (H) 5.0 - 15.0 mmol/L   Basic Metabolic Panel    Specimen: Blood   Result Value Ref Range    Glucose 113 (H) 65 - 99 mg/dL    BUN 51 (H) 8 - 23 mg/dL    Creatinine 3.05 (H) 0.76 - 1.27 mg/dL    Sodium 149 (H) 136 - 145 mmol/L    Potassium 2.7 (L) 3.5 - 5.2 mmol/L    Chloride 116 (H) 98 - 107 mmol/L    CO2 14.3 (L) 22.0 - 29.0 mmol/L    Calcium 7.9 (L) 8.6 - 10.5 mg/dL    eGFR Non African Amer 21 (L) >60 mL/min/1.73    BUN/Creatinine Ratio 16.7 7.0 - 25.0    Anion Gap 18.7 (H) 5.0 - 15.0 mmol/L   Blood Gas, Arterial  With Co-Ox    Specimen: Arterial Blood   Result Value Ref Range    Site Right Radial     Bruce's Test Positive     pH, Arterial 7.278 (L) 7.350 - 7.450 pH units    pCO2, Arterial 18.9 (C) 35.0 - 45.0 mm Hg    pO2, Arterial 39.4 (C) 83.0 - 108.0 mm Hg    HCO3, Arterial 8.8 (L) 20.0 - 26.0 mmol/L    Base Excess, Arterial -15.7 (L) 0.0 - 2.0 mmol/L    O2 Saturation, Arterial 80.5 (L) 94.0 - 99.0 %    Hemoglobin, Blood Gas 13.0 (L) 14 - 18 g/dL    Hematocrit, Blood Gas 39.7 38.0 - 51.0 %    Oxyhemoglobin 79.4 (L) 94 - 99 %    Methemoglobin 0.30 0.00 - 3.00 %    Carboxyhemoglobin 1.1 0 - 5 %    A-a Gradiant 129.3 0.0 - 300.0 mmHg    CO2 Content 9.4 (L) 22 - 33 mmol/L    Temperature 0.0 C    Barometric Pressure for Blood Gas 725 mmHg    Modality Nasal Cannula     FIO2 28 %    Flow Rate 2.0 lpm    Ventilator Mode NA     Note Read back and acknowledge     Notified Who ER  AND RN     Notified By [id number]     Notified Time 12/21/2020 19:03     Collected by [id number]     pH, Temp Corrected      pCO2, Temperature Corrected      pO2, Temperature Corrected     POC Glucose Once    Specimen: Blood   Result Value Ref Range    Glucose >599 (C) 70 - 130 mg/dL   POC Glucose Once    Specimen: Blood   Result Value Ref Range    Glucose >599 (C) 70 - 130 mg/dL   POC Glucose Once    Specimen: Blood   Result Value Ref Range    Glucose >599 (C) 70 - 130 mg/dL   POC Glucose Once    Specimen: Blood   Result Value Ref Range    Glucose 596 (C) 70 - 130 mg/dL   POC Glucose Once    Specimen: Blood   Result Value Ref Range    Glucose 496 (C) 70 - 130 mg/dL   POC Glucose Once    Specimen: Blood   Result Value Ref Range    Glucose 393 (H) 70 - 130 mg/dL   POC Glucose Once    Specimen: Blood   Result Value Ref Range    Glucose 394 (H) 70 - 130 mg/dL   POC Glucose Once    Specimen: Blood   Result Value Ref Range    Glucose 253 (H) 70 - 130 mg/dL   POC Glucose Once    Specimen: Blood   Result Value Ref Range    Glucose 267 (H) 70 - 130 mg/dL   POC Glucose  Once    Specimen: Blood   Result Value Ref Range    Glucose 235 (H) 70 - 130 mg/dL   POC Glucose Once    Specimen: Blood   Result Value Ref Range    Glucose 220 (H) 70 - 130 mg/dL   POC Glucose Once    Specimen: Blood   Result Value Ref Range    Glucose 142 (H) 70 - 130 mg/dL   POC Glucose Once    Specimen: Blood   Result Value Ref Range    Glucose 172 (H) 70 - 130 mg/dL   POC Glucose Once    Specimen: Blood   Result Value Ref Range    Glucose 132 (H) 70 - 130 mg/dL   POC Glucose Once    Specimen: Blood   Result Value Ref Range    Glucose 89 70 - 130 mg/dL   ECG 12 Lead   Result Value Ref Range    QT Interval 416 ms    QTC Interval 455 ms   Light Blue Top   Result Value Ref Range    Extra Tube hold for add-on    Green Top (Gel)   Result Value Ref Range    Extra Tube Hold for add-ons.    Lavender Top   Result Value Ref Range    Extra Tube hold for add-on    Gold Top - SST   Result Value Ref Range    Extra Tube Hold for add-ons.               ED Course  ED Course as of Dec 26 0851   Mon Dec 21, 2020   1110 Bicarb therapy considered, controversial.  Decision to give 1 amp intravenously and recheck blood gas in 1 hour.    [CM]   1211 ABG #2 has not come across on epic.  6.938/14.2/76.7/3.0.    [CM]   1258 Chemistry panel still not yet resulted.    [CM]   1358 Records requested from patient's last admission at [facility] in [facility].    [CM]   1526 Nursing reports still no urine output.  Sepulveda catheter ordered.    [CM]   1806 We still have no available intensive care beds.  There are no known intensive care beds available in our region.  We will continue our search.  Patient is endorsed to Dr. Esparza at shift change.    [CM]   1807 Signout on this critically ill 65-year-old patient who is currently hypotensive he is just now getting started on Levophed we will go check on him immediately.    [JM]   3104 BMP shows decreased potassium despite potassium infusion.  Will hold insulin for now.   We will give a dose of oral potassium at this time.  With the nursing she is informed of the plan.  She is not immediately to the room to hold the insulin    [JM]   5546 I discussed the case with dr cortes. The patient has been accepted. We are awaiting bed information.     [JM]      ED Course User Index  [CM] Elie Salas MD  [JM] Sathya Esparza, DO                                           MDM  Number of Diagnoses or Management Options  Diabetic ketoacidosis without coma associated with other specified diabetes mellitus (CMS/HCC):   Pneumonia of both lower lobes due to infectious organism:   Critical Care  Total time providing critical care: 30-74 minutes (60)      Final diagnoses:   Diabetic ketoacidosis without coma associated with other specified diabetes mellitus (CMS/HCC)   Pneumonia of both lower lobes due to infectious organism             Please note that portions of this note were completed with a voice recognition program.        Elie Salas MD  12/26/20 0862

## 2020-12-21 NOTE — ED NOTES
Called [phone] [facility] to get medical records. Lady told to fax release to [phone]     Nancy Hines, PCT  12/21/20 3693

## 2020-12-21 NOTE — ED NOTES
Ирина Campovan (patient's sister): [phone] (cell), [phone]     Sourav Ruiz, ANAMIKA  12/21/20 9201

## 2020-12-22 VITALS
HEIGHT: 72 IN | DIASTOLIC BLOOD PRESSURE: 45 MMHG | TEMPERATURE: 97.8 F | HEART RATE: 99 BPM | RESPIRATION RATE: 18 BRPM | SYSTOLIC BLOOD PRESSURE: 83 MMHG | WEIGHT: 170 LBS | OXYGEN SATURATION: 97 % | BODY MASS INDEX: 23.03 KG/M2

## 2020-12-22 LAB
ANION GAP SERPL CALCULATED.3IONS-SCNC: 18.7 MMOL/L (ref 5–15)
BUN SERPL-MCNC: 51 MG/DL (ref 8–23)
BUN/CREAT SERPL: 16.7 (ref 7–25)
CALCIUM SPEC-SCNC: 7.9 MG/DL (ref 8.6–10.5)
CHLORIDE SERPL-SCNC: 116 MMOL/L (ref 98–107)
CO2 SERPL-SCNC: 14.3 MMOL/L (ref 22–29)
CREAT SERPL-MCNC: 3.05 MG/DL (ref 0.76–1.27)
GFR SERPL CREATININE-BSD FRML MDRD: 21 ML/MIN/1.73
GLUCOSE SERPL-MCNC: 113 MG/DL (ref 65–99)
POTASSIUM SERPL-SCNC: 2.7 MMOL/L (ref 3.5–5.2)
SODIUM SERPL-SCNC: 149 MMOL/L (ref 136–145)

## 2020-12-22 PROCEDURE — 96366 THER/PROPH/DIAG IV INF ADDON: CPT

## 2020-12-22 NOTE — ED NOTES
Liv called back from [facility]. Report [phone]. Fax [phone].     Ken Fontenot  12/21/20 4214

## 2020-12-22 NOTE — ED NOTES
Pt leaving the ED at this time with [facility]. Pt remains lethargic but arouses to voice, pt respirations even and unlabored, VSS, NADN. Pt tolerating levophed at this time. Pt's medications infusing without difficulty. Pt belongings being transferred with pt.      Darleen Galarza, RN  12/22/20 0138

## 2020-12-22 NOTE — ED NOTES
Glucose done by me, results are 89mg/dL. Dr. Esparza is aware.      Symes, Heather  12/21/20 1223

## 2020-12-22 NOTE — ED NOTES
Dr. Mooney with [facility] called back to speak with Dr. Esparza.     Ken Fontenot  12/21/20 4345

## 2020-12-22 NOTE — ED NOTES
I called [facility] about transferring the patient, I spoke with Gillian.     Ken Fontenot  12/21/20 7663

## 2020-12-22 NOTE — ED NOTES
I called [facility] about transferring the patient, they are currently on a wait list.     Ken Fontenot  12/21/20 1398

## 2020-12-22 NOTE — ED NOTES
Introduced myself to pt at this time. Pt is resting comfortably on ED stretcher. Pt is updated on plan of care and timeframe for results. Pt is lethargic but arouses to voice, respirations even and unlabored, VSS, NADN. Pt voices no needs. Bed locked and low, call light in reach, will continue to monitor.       Darleen Galarza, RN  12/22/20 0124

## 2020-12-22 NOTE — ED NOTES
I called [facility] and spoke to Liv about getting the patient transferred to their ICU, she doesn't know if she will be able to get a bed for the patient tonight, she will need to speak with her ICU staff.     Ken Fontenot  12/21/20 6485

## 2020-12-25 LAB
QT INTERVAL: 416 MS
QTC INTERVAL: 455 MS

## 2020-12-26 LAB
BACTERIA SPEC AEROBE CULT: NORMAL
BACTERIA SPEC AEROBE CULT: NORMAL

## 2021-03-05 ENCOUNTER — IMMUNIZATION (OUTPATIENT)
Dept: VACCINE CLINIC | Facility: HOSPITAL | Age: 66
End: 2021-03-05

## 2021-03-05 PROCEDURE — 91300 HC SARSCOV02 VAC 30MCG/0.3ML IM: CPT | Performed by: INTERNAL MEDICINE

## 2021-03-05 PROCEDURE — 0001A: CPT | Performed by: INTERNAL MEDICINE

## 2021-03-26 ENCOUNTER — IMMUNIZATION (OUTPATIENT)
Dept: VACCINE CLINIC | Facility: HOSPITAL | Age: 66
End: 2021-03-26

## 2021-03-26 PROCEDURE — 0002A: CPT | Performed by: INTERNAL MEDICINE

## 2021-03-26 PROCEDURE — 91300 HC SARSCOV02 VAC 30MCG/0.3ML IM: CPT | Performed by: INTERNAL MEDICINE

## 2023-02-23 ENCOUNTER — TRANSCRIBE ORDERS (OUTPATIENT)
Dept: ADMINISTRATIVE | Facility: HOSPITAL | Age: 68
End: 2023-02-23
Payer: MEDICARE

## 2023-02-23 DIAGNOSIS — R11.2 NAUSEA AND VOMITING, UNSPECIFIED VOMITING TYPE: Primary | ICD-10-CM

## 2023-03-09 ENCOUNTER — HOSPITAL ENCOUNTER (OUTPATIENT)
Dept: ULTRASOUND IMAGING | Facility: HOSPITAL | Age: 68
Discharge: HOME | End: 2023-03-09
Admitting: INTERNAL MEDICINE
Payer: MEDICARE

## 2023-03-09 DIAGNOSIS — R11.2 NAUSEA AND VOMITING, UNSPECIFIED VOMITING TYPE: ICD-10-CM

## 2023-03-09 PROCEDURE — 76700 US EXAM ABDOM COMPLETE: CPT

## 2023-03-09 PROCEDURE — 76700 US EXAM ABDOM COMPLETE: CPT | Performed by: RADIOLOGY

## 2024-08-01 ENCOUNTER — HOSPITAL ENCOUNTER (OUTPATIENT)
Dept: GENERAL RADIOLOGY | Facility: HOSPITAL | Age: 69
Discharge: HOME | End: 2024-08-01
Admitting: FAMILY MEDICINE
Payer: MEDICARE

## 2024-08-01 DIAGNOSIS — M25.521 RIGHT ELBOW PAIN: ICD-10-CM

## 2024-08-01 PROCEDURE — 73080 X-RAY EXAM OF ELBOW: CPT

## 2024-08-01 PROCEDURE — 73080 X-RAY EXAM OF ELBOW: CPT | Performed by: RADIOLOGY

## 2024-08-22 ENCOUNTER — OFFICE VISIT (OUTPATIENT)
Dept: ORTHOPEDIC SURGERY | Facility: CLINIC | Age: 69
End: 2024-08-22
Payer: MEDICARE

## 2024-08-22 VITALS
TEMPERATURE: 98.2 F | HEIGHT: 72 IN | RESPIRATION RATE: 18 BRPM | OXYGEN SATURATION: 99 % | DIASTOLIC BLOOD PRESSURE: 60 MMHG | SYSTOLIC BLOOD PRESSURE: 139 MMHG | HEART RATE: 50 BPM | WEIGHT: 170 LBS | BODY MASS INDEX: 23.03 KG/M2

## 2024-08-22 DIAGNOSIS — M77.11 RIGHT LATERAL EPICONDYLITIS: Primary | ICD-10-CM

## 2024-08-22 PROCEDURE — 99203 OFFICE O/P NEW LOW 30 MIN: CPT | Performed by: PHYSICIAN ASSISTANT

## 2024-08-22 RX ORDER — PRAVASTATIN SODIUM 40 MG
40 TABLET ORAL NIGHTLY
COMMUNITY

## 2024-08-22 RX ORDER — TRAMADOL HYDROCHLORIDE 50 MG/1
1 TABLET ORAL 3 TIMES DAILY
COMMUNITY
Start: 2024-08-01

## 2024-08-22 RX ORDER — EMPAGLIFLOZIN 10 MG/1
1 TABLET, FILM COATED ORAL DAILY
COMMUNITY
Start: 2024-06-09

## 2024-08-22 RX ORDER — INSULIN ASPART INJECTION 100 [IU]/ML
INJECTION, SOLUTION SUBCUTANEOUS
COMMUNITY
Start: 2024-06-05

## 2024-08-22 RX ORDER — INSULIN DEGLUDEC 100 U/ML
INJECTION, SOLUTION SUBCUTANEOUS
COMMUNITY
Start: 2024-07-09

## 2024-08-22 RX ORDER — TADALAFIL 20 MG/1
1 TABLET ORAL DAILY
COMMUNITY
Start: 2024-08-19

## 2024-08-22 NOTE — PROGRESS NOTES
[facility] [place] Orthopaedic Surgery New Patient Visit          Patient: Demond Valdez  YOB: 1955  Date of Encounter: 08/22/2024  PCP: Bony Moon MD      Subjective     No chief complaint on file.          History of Present Illness:     Demond Valdez is a 68 y.o. male presents today as result of several week history of progressive right elbow pain.  Patient reports pain to the anterior dorsal lateral aspect of the elbow worse upon repetitive motion and lifting.  Patient states that he had an incidence of this several years ago after which he was diagnosed with tennis elbow and attempted bracing and medication.  Patient is insulin-dependent diabetic and has had complications with decreased kidney function and has been unable to be a candidate for steroid oral NSAID usage.  The patient reports dull throbbing aching pain to the lateral elbow with a sharp stabbing sensation upon certain range of motion activities.  Patient denies any current paresthesias.  He reports no significant swelling.        There is no problem list on file for this patient.    No past medical history on file.  No past surgical history on file.  Social History     Occupational History    Not on file   Tobacco Use    Smoking status: Former     Types: Cigarettes    Smokeless tobacco: Current   Substance and Sexual Activity    Alcohol use: Not on file    Drug use: Not on file    Sexual activity: Not on file    Demond Valdez  reports that he has quit smoking. His smoking use included cigarettes. He uses smokeless tobacco. I have educated him on the risk of diseases from using tobacco products such as cancer, COPD, and heart disease.        Social History     Social History Narrative    Not on file     No family history on file.  Current Outpatient Medications   Medication Sig Dispense Refill    aspirin 81 MG chewable tablet Chew 1 tablet Daily.      Fiasp FlexTouch 100 UNIT/ML solution pen-injector INJECT 10 UNITS SUB-Q  "FOR BREAKFAST AND 10 UNITS NIGHTLY      Jardiance 10 MG tablet tablet Take 1 tablet by mouth Daily.      lisinopril (PRINIVIL,ZESTRIL) 2.5 MG tablet Take 1 tablet by mouth Daily.      pantoprazole (PROTONIX) 40 MG EC tablet Take 1 tablet by mouth Daily.      pravastatin (PRAVACHOL) 40 MG tablet Take 1 tablet by mouth Every Night.      tadalafil (CIALIS) 20 MG tablet Take 1 tablet by mouth Daily.      traMADol (ULTRAM) 50 MG tablet Take 1 tablet by mouth 3 times a day.      Tresiba FlexTouch 100 UNIT/ML solution pen-injector injection INJECT 25 UNITS SUBCUTANEOUSLY ONCE DAILY      glyburide (DIAbeta) 5 MG tablet Take 5 mg by mouth Daily With Breakfast. (Patient not taking: Reported on 8/22/2024)       No current facility-administered medications for this visit.     No Known Allergies         Review of Systems   Constitutional: Negative.   HENT: Negative.     Eyes: Negative.    Cardiovascular: Negative.    Respiratory: Negative.     Endocrine: Negative.    Hematologic/Lymphatic: Negative.    Skin: Negative.    Musculoskeletal:  Positive for joint pain and joint swelling.        Pertinent positives listed in HPI   Gastrointestinal: Negative.    Genitourinary:  Positive for frequency.   Neurological: Negative.    Psychiatric/Behavioral: Negative.     Allergic/Immunologic: Negative.          Objective      Vitals:    08/22/24 1104   BP: 139/60   BP Location: Right arm   Patient Position: Sitting   Cuff Size: Adult   Pulse: 50   Resp: 18   Temp: 98.2 °F (36.8 °C)   TempSrc: Temporal   SpO2: 99%   Weight: 77.1 kg (170 lb)   Height: 182.9 cm (72.01\")      BMI cannot be calculated due to outdated height or weight values.  Please input a current height/weight in Vitals and re-renter BMIFOLLOWUP in Note to pull in correct documentation based on BMI range.      Physical Exam  Vitals and nursing note reviewed.   Constitutional:       General: He is not in acute distress.     Appearance: Normal appearance. He is not " ill-appearing.   HENT:      Head: Normocephalic and atraumatic.      Right Ear: External ear normal.      Left Ear: External ear normal.      Nose: Nose normal.      Mouth/Throat:      Mouth: Mucous membranes are moist.      Pharynx: Oropharynx is clear.   Eyes:      Extraocular Movements: Extraocular movements intact.      Conjunctiva/sclera: Conjunctivae normal.      Pupils: Pupils are equal, round, and reactive to light.   Cardiovascular:      Rate and Rhythm: Normal rate.      Pulses: Normal pulses.   Pulmonary:      Effort: Pulmonary effort is normal.   Abdominal:      General: There is no distension.   Musculoskeletal:      Cervical back: Normal range of motion. No rigidity.      Comments: Examination today patient's right elbow reveals there is tenderness to palpation along the common extensor tendon and lateral epicondyle.  The patient has full range of motion with pain upon oppositional extension of the forearm.  Patient exhibits full flexion and extension of the wrist with no significant pain upon pronation or supination.  Neurovascular status grossly intact  upper extremity.   Skin:     General: Skin is warm and dry.      Capillary Refill: Capillary refill takes less than 2 seconds.   Neurological:      General: No focal deficit present.      Mental Status: He is alert and oriented to person, place, and time.   Psychiatric:         Mood and Affect: Mood normal.         Behavior: Behavior normal.                 Radiology:        XR Elbow 3+ View Right    Result Date: 8/1/2024  Olecranon enthesophyte.      This report was finalized on 8/1/2024 1:24 PM by Oj Hall M.D..           Assessment/Plan        ICD-10-CM ICD-9-CM   1. Right lateral epicondylitis  M77.11 726.32       68-year-old male with notable several week history of progressive onset intermittent right lateral epicondylitis.  The patient had several year history in which she had difficulty with lateral epicondylitis.  This was resolved  with conservative treatment options at that time and he was doing well until approximately 4 weeks ago when he began to have return the onset of pain and symptoms.  Patient has attempted the previous bracing with some benefit.  Patient has noticed some decrease in overall the swelling and pain and he is unable to proceed with steroid injection as well as the NSAIDs secondary to his insulin-dependent diabetes and decreased kidney function.  As result of this the patient was instructed to continue with the bracing as well as an order for formal outpatient physical therapy with emphasis on generalized stretching as well as modalities such as iontophoresis and dry needling.  The patient will return back in 4 weeks for further evaluation of the efficacy of the conservative treatment.                    This document was signed by Tadeo Huffman PA-C August 22, 2024     CC: Bony Moon MD      Dictated Utilizing Dragon Dictation:   Please note that portions of this note were completed with a voice recognition program.   Part of this note may be an electronic transcription/translation of spoken language to printed text using the Dragon Dictation System.

## 2024-08-29 ENCOUNTER — PATIENT ROUNDING (BHMG ONLY) (OUTPATIENT)
Dept: ORTHOPEDIC SURGERY | Facility: CLINIC | Age: 69
End: 2024-08-29
Payer: MEDICARE

## 2024-08-29 NOTE — PROGRESS NOTES
August 29, 2024    Hello, may I speak with Demond Valdez?    My name is Ines VANN,      I am  with [facility] [place]  [facility]  [address]  [place] [address] [phone].    Before we get started may I verify your date of birth? 1955    I am calling to officially welcome you to our practice and ask about your recent visit. Is this a good time to talk? yes    Tell me about your visit with us. What things went well?  Everything went good.       We're always looking for ways to make our patients' experiences even better. Do you have recommendations on ways we may improve?  no    Overall were you satisfied with your first visit to our practice? yes       I appreciate you taking the time to speak with me today. Is there anything else I can do for you? no      Thank you, and have a great day.

## 2024-09-19 ENCOUNTER — OFFICE VISIT (OUTPATIENT)
Dept: ORTHOPEDIC SURGERY | Facility: CLINIC | Age: 69
End: 2024-09-19
Payer: MEDICARE

## 2024-09-19 VITALS — WEIGHT: 170 LBS | HEIGHT: 72 IN | BODY MASS INDEX: 23.03 KG/M2

## 2024-09-19 DIAGNOSIS — M77.11 RIGHT LATERAL EPICONDYLITIS: Primary | ICD-10-CM

## 2024-09-19 PROCEDURE — 99213 OFFICE O/P EST LOW 20 MIN: CPT | Performed by: PHYSICIAN ASSISTANT

## 2024-09-19 NOTE — PROGRESS NOTES
[facility] [place] Orthopaedic Surgery Established Patient Visit          Patient: Demond Valdez  YOB: 1955  Date of Encounter: 9/19/2024  PCP: Bony Moon MD      Subjective     Chief Complaint   Patient presents with    Right Wrist - Follow-up, Pain           History of Present Illness:     Demond Valdez is a 69 y.o. male presents today as result of several week history of progressive right elbow pain.  Patient reports pain to the anterior dorsal lateral aspect of the elbow worse upon repetitive motion and lifting.  Patient states that he had an incidence of this several years ago after which he was diagnosed with tennis elbow and attempted bracing and medication.  Patient is insulin-dependent diabetic and has had complications with decreased kidney function and has been unable to be a candidate for steroid oral NSAID usage.  The patient reports dull throbbing aching pain to the lateral elbow with a sharp stabbing sensation upon certain range of motion activities.  Patient denies any current paresthesias.  He reports no significant swelling.        There is no problem list on file for this patient.    History reviewed. No pertinent past medical history.  History reviewed. No pertinent surgical history.  Social History     Occupational History    Not on file   Tobacco Use    Smoking status: Former     Types: Cigarettes    Smokeless tobacco: Current   Vaping Use    Vaping status: Never Used   Substance and Sexual Activity    Alcohol use: Yes     Comment: occasionally    Drug use: Never    Sexual activity: Defer    Demond Valdez  reports that he has quit smoking. His smoking use included cigarettes. He uses smokeless tobacco. I have educated him on the risk of diseases from using tobacco products such as cancer, COPD, and heart disease.        Social History     Social History Narrative    Not on file     History reviewed. No pertinent family history.  Current Outpatient Medications  "  Medication Sig Dispense Refill    aspirin 81 MG chewable tablet Chew 1 tablet Daily.      Fiasp FlexTouch 100 UNIT/ML solution pen-injector INJECT 10 UNITS SUB-Q FOR BREAKFAST AND 10 UNITS NIGHTLY      glyburide (DIAbeta) 5 MG tablet Take 1 tablet by mouth Daily With Breakfast.      Jardiance 10 MG tablet tablet Take 1 tablet by mouth Daily.      lisinopril (PRINIVIL,ZESTRIL) 2.5 MG tablet Take 1 tablet by mouth Daily.      pantoprazole (PROTONIX) 40 MG EC tablet Take 1 tablet by mouth Daily.      pravastatin (PRAVACHOL) 40 MG tablet Take 1 tablet by mouth Every Night.      tadalafil (CIALIS) 20 MG tablet Take 1 tablet by mouth Daily.      traMADol (ULTRAM) 50 MG tablet Take 1 tablet by mouth 3 times a day.      Tresiba FlexTouch 100 UNIT/ML solution pen-injector injection INJECT 25 UNITS SUBCUTANEOUSLY ONCE DAILY       No current facility-administered medications for this visit.     No Known Allergies         Review of Systems   Constitutional: Negative.   HENT: Negative.     Eyes: Negative.    Cardiovascular: Negative.    Respiratory: Negative.     Endocrine: Negative.    Hematologic/Lymphatic: Negative.    Skin: Negative.    Musculoskeletal:  Positive for joint pain and joint swelling.        Pertinent positives listed in HPI   Gastrointestinal: Negative.    Genitourinary:  Positive for frequency.   Neurological: Negative.    Psychiatric/Behavioral: Negative.     Allergic/Immunologic: Negative.          Objective      Vitals:    09/19/24 0817   Weight: 77.1 kg (170 lb)   Height: 182.9 cm (72\")      BMI cannot be calculated due to outdated height or weight values.  Please input a current height/weight in Vitals and re-renter BMIFOLLOWUP in Note to pull in correct documentation based on BMI range.      Physical Exam  Vitals and nursing note reviewed.   Constitutional:       General: He is not in acute distress.     Appearance: Normal appearance. He is not ill-appearing.   HENT:      Head: Normocephalic and " atraumatic.      Right Ear: External ear normal.      Left Ear: External ear normal.      Nose: Nose normal.      Mouth/Throat:      Mouth: Mucous membranes are moist.      Pharynx: Oropharynx is clear.   Eyes:      Extraocular Movements: Extraocular movements intact.      Conjunctiva/sclera: Conjunctivae normal.      Pupils: Pupils are equal, round, and reactive to light.   Cardiovascular:      Rate and Rhythm: Normal rate.      Pulses: Normal pulses.   Pulmonary:      Effort: Pulmonary effort is normal.   Abdominal:      General: There is no distension.   Musculoskeletal:      Cervical back: Normal range of motion. No rigidity.      Comments: Examination today patient's right elbow reveals there is tenderness to palpation along the common extensor tendon and lateral epicondyle.  The patient has full range of motion with pain upon oppositional extension of the forearm.  Patient exhibits full flexion and extension of the wrist with no significant pain upon pronation or supination.  Neurovascular status grossly intact  upper extremity.   Skin:     General: Skin is warm and dry.      Capillary Refill: Capillary refill takes less than 2 seconds.   Neurological:      General: No focal deficit present.      Mental Status: He is alert and oriented to person, place, and time.   Psychiatric:         Mood and Affect: Mood normal.         Behavior: Behavior normal.                 Radiology:        No radiology results for the last 30 days.        Assessment/Plan        ICD-10-CM ICD-9-CM   1. Right lateral epicondylitis  M77.11 726.32         68-year-old male with notable several week history of progressive onset intermittent right lateral epicondylitis.  The patient had several year history in which he had difficulty with lateral epicondylitis.  This was resolved with conservative treatment options at that time and he was doing well until approximately 4 weeks ago when he began to have return the onset of pain and symptoms.   Patient has attempted the previous bracing with some benefit.  Patient has noticed some decrease in overall the swelling and pain and he is unable to proceed with steroid injection as well as the NSAIDs secondary to his insulin-dependent diabetes and decreased kidney function.  As result of this the patient was instructed to continue with the bracing as well as an order for formal outpatient physical therapy with emphasis on generalized stretching as well as modalities such as iontophoresis and dry needling.  The patient has done well with the conservative treatment and he will continue to implement the conservative treatment and the patient will return back in 4 weeks for further evaluation.  No direct surgical indication.                  This document was signed by Tadeo Huffman PA-C September 19, 2024    CC: Bony Moon MD      Dictated Utilizing Dragon Dictation:   Please note that portions of this note were completed with a voice recognition program.   Part of this note may be an electronic transcription/translation of spoken language to printed text using the Dragon Dictation System.

## 2024-10-17 ENCOUNTER — OFFICE VISIT (OUTPATIENT)
Dept: ORTHOPEDIC SURGERY | Facility: CLINIC | Age: 69
End: 2024-10-17
Payer: MEDICARE

## 2024-10-17 ENCOUNTER — HOSPITAL ENCOUNTER (OUTPATIENT)
Dept: GENERAL RADIOLOGY | Facility: HOSPITAL | Age: 69
Discharge: HOME | End: 2024-10-17
Admitting: PHYSICIAN ASSISTANT
Payer: MEDICARE

## 2024-10-17 VITALS — HEIGHT: 72 IN | WEIGHT: 170 LBS | BODY MASS INDEX: 23.03 KG/M2

## 2024-10-17 DIAGNOSIS — M25.531 RIGHT WRIST PAIN: Primary | ICD-10-CM

## 2024-10-17 DIAGNOSIS — M77.11 RIGHT LATERAL EPICONDYLITIS: ICD-10-CM

## 2024-10-17 PROCEDURE — 73110 X-RAY EXAM OF WRIST: CPT

## 2024-10-17 PROCEDURE — 99213 OFFICE O/P EST LOW 20 MIN: CPT | Performed by: PHYSICIAN ASSISTANT

## 2024-10-17 NOTE — PROGRESS NOTES
[facility] [place] Orthopaedic Surgery Established Patient Visit          Patient: Demond Valdez  YOB: 1955  Date of Encounter: 10/17/2024  PCP: Bony Moon MD      Subjective     Chief Complaint   Patient presents with    Right Wrist - Follow-up, Pain           History of Present Illness:     Demond Valdez is a 69 y.o. male presents today as result of several week history of progressive right elbow pain.  Patient reports pain to the anterior dorsal lateral aspect of the elbow worse upon repetitive motion and lifting.  Patient states that he had an incidence of this several years ago after which he was diagnosed with tennis elbow and attempted bracing and medication.  Patient is insulin-dependent diabetic and has had complications with decreased kidney function and has been unable to be a candidate for steroid oral NSAID usage.  The patient reports dull throbbing aching pain to the lateral elbow with a sharp stabbing sensation upon certain range of motion activities.   Patient denies any current paresthesias. Patient also has complaints of generalized wrist pain certain range of motion most notably extension.  Patient reports no specific injury however this is progressively worsened over the last several weeks        There is no problem list on file for this patient.    History reviewed. No pertinent past medical history.  History reviewed. No pertinent surgical history.  Social History     Occupational History    Not on file   Tobacco Use    Smoking status: Former     Types: Cigarettes    Smokeless tobacco: Current   Vaping Use    Vaping status: Never Used   Substance and Sexual Activity    Alcohol use: Yes     Comment: occasionally    Drug use: Never    Sexual activity: Defer    Demond Valdez  reports that he has quit smoking. His smoking use included cigarettes. He uses smokeless tobacco. I have educated him on the risk of diseases from using tobacco products such as cancer, COPD, and  "heart disease.        Social History     Social History Narrative    Not on file     History reviewed. No pertinent family history.  Current Outpatient Medications   Medication Sig Dispense Refill    aspirin 81 MG chewable tablet Chew 1 tablet Daily.      Fiasp FlexTouch 100 UNIT/ML solution pen-injector INJECT 10 UNITS SUB-Q FOR BREAKFAST AND 10 UNITS NIGHTLY      glyburide (DIAbeta) 5 MG tablet Take 1 tablet by mouth Daily With Breakfast.      Jardiance 10 MG tablet tablet Take 1 tablet by mouth Daily.      lisinopril (PRINIVIL,ZESTRIL) 2.5 MG tablet Take 1 tablet by mouth Daily.      pantoprazole (PROTONIX) 40 MG EC tablet Take 1 tablet by mouth Daily.      pravastatin (PRAVACHOL) 40 MG tablet Take 1 tablet by mouth Every Night.      tadalafil (CIALIS) 20 MG tablet Take 1 tablet by mouth Daily.      traMADol (ULTRAM) 50 MG tablet Take 1 tablet by mouth 3 times a day.      Tresiba FlexTouch 100 UNIT/ML solution pen-injector injection INJECT 25 UNITS SUBCUTANEOUSLY ONCE DAILY      methylPREDNISolone (MEDROL) 4 MG dose pack Use as directed by package instructions 21 tablet 0     No current facility-administered medications for this visit.     No Known Allergies         Review of Systems   Constitutional: Negative.   HENT: Negative.     Eyes: Negative.    Cardiovascular: Negative.    Respiratory: Negative.     Endocrine: Negative.    Hematologic/Lymphatic: Negative.    Skin: Negative.    Musculoskeletal:  Positive for joint pain and joint swelling.        Pertinent positives listed in HPI   Gastrointestinal: Negative.    Genitourinary:  Positive for frequency.   Neurological: Negative.    Psychiatric/Behavioral: Negative.     Allergic/Immunologic: Negative.          Objective      Vitals:    10/17/24 1008   Weight: 77.1 kg (170 lb)   Height: 182.9 cm (72\")      BMI cannot be calculated due to outdated height or weight values.  Please input a current height/weight in Vitals and re-renter BMIFOLLOWUP in Note to pull in " correct documentation based on BMI range.      Physical Exam  Vitals and nursing note reviewed.   Constitutional:       General: He is not in acute distress.     Appearance: Normal appearance. He is not ill-appearing.   HENT:      Head: Normocephalic and atraumatic.      Right Ear: External ear normal.      Left Ear: External ear normal.      Nose: Nose normal.      Mouth/Throat:      Mouth: Mucous membranes are moist.      Pharynx: Oropharynx is clear.   Eyes:      Extraocular Movements: Extraocular movements intact.      Conjunctiva/sclera: Conjunctivae normal.      Pupils: Pupils are equal, round, and reactive to light.   Cardiovascular:      Rate and Rhythm: Normal rate.      Pulses: Normal pulses.   Pulmonary:      Effort: Pulmonary effort is normal.   Abdominal:      General: There is no distension.   Musculoskeletal:      Cervical back: Normal range of motion. No rigidity.      Comments: Examination today patient's right elbow reveals there is tenderness to palpation along the common extensor tendon and lateral epicondyle.  The patient has full range of motion with pain upon oppositional extension of the forearm.  Patient exhibits full flexion and extension of the wrist with no significant pain upon pronation or supination.  Neurovascular status grossly intact  upper extremity.   Skin:     General: Skin is warm and dry.      Capillary Refill: Capillary refill takes less than 2 seconds.   Neurological:      General: No focal deficit present.      Mental Status: He is alert and oriented to person, place, and time.   Psychiatric:         Mood and Affect: Mood normal.         Behavior: Behavior normal.                 Radiology:        XR Wrist 3+ View Right    Result Date: 10/17/2024    No acute findings in the right wrist.   This report was finalized on 10/17/2024 11:40 AM by Dr. Laz Ferguson MD.           Assessment/Plan        ICD-10-CM ICD-9-CM   1. Right wrist pain  M25.531 719.43   2. Right lateral  epicondylitis  M77.11 726.32           68-year-old male with notable several week history of progressive onset intermittent right lateral epicondylitis.  The patient had several year history in which he had difficulty with lateral epicondylitis.  This was resolved with conservative treatment options at that time and he was doing well until approximately 4 weeks ago when he began to have return the onset of pain and symptoms.  Patient has attempted the previous bracing with some benefit.  Patient has noticed some decrease in overall the swelling and pain and he is unable to proceed with steroid injection as well as the NSAIDs secondary to his insulin-dependent diabetes and decreased kidney function.  As result of this the patient was instructed to implement thumb  bracing as well as an order for formal outpatient physical therapy with emphasis on generalized stretching as well as modalities such as iontophoresis and dry needling.  The patient has done well with the conservative treatment and he will continue to implement the conservative treatment and the patient will return back in 4 weeks for further evaluation.  No direct surgical indication.                  This document was signed by Tadeo Huffman PA-C October 17, 2024    CC: Bony Moon MD      Dictated Utilizing Dragon Dictation:   Please note that portions of this note were completed with a voice recognition program.   Part of this note may be an electronic transcription/translation of spoken language to printed text using the Dragon Dictation System.

## 2024-10-31 ENCOUNTER — OFFICE VISIT (OUTPATIENT)
Dept: ORTHOPEDIC SURGERY | Facility: CLINIC | Age: 69
End: 2024-10-31
Payer: MEDICARE

## 2024-10-31 VITALS — HEIGHT: 72 IN | BODY MASS INDEX: 23.02 KG/M2 | WEIGHT: 169.97 LBS

## 2024-10-31 DIAGNOSIS — M25.531 RIGHT WRIST PAIN: Primary | ICD-10-CM

## 2024-10-31 PROCEDURE — 99213 OFFICE O/P EST LOW 20 MIN: CPT | Performed by: PHYSICIAN ASSISTANT

## 2024-10-31 RX ORDER — METHYLPREDNISOLONE 4 MG/1
TABLET ORAL
Qty: 21 TABLET | Refills: 0 | Status: SHIPPED | OUTPATIENT
Start: 2024-10-31 | End: 2024-10-31

## 2024-10-31 RX ORDER — METHYLPREDNISOLONE 4 MG/1
TABLET ORAL
Qty: 21 TABLET | Refills: 0 | Status: SHIPPED | OUTPATIENT
Start: 2024-10-31

## 2024-10-31 NOTE — PROGRESS NOTES
[facility] [place] Orthopaedic Surgery Established Patient Visit          Patient: Demond Valdez  YOB: 1955  Date of Encounter: 10/31/2024  PCP: Bony Moon MD      Subjective     Chief Complaint   Patient presents with    Right Hand - Pain, Edema, Follow-up           History of Present Illness:     Demond Valdez is a 69 y.o. male presents today as result of several week history of progressive right elbow/wrist pain.  Patient has undergone therapy as well as cock up wrist brace immobilization with continuation of pain into the right wrist.  Patient reports diffuse pain with repetitive activity and notable swelling.  He has had resolution of his elbow pain.  Patient is unable to take NSAIDs secondary to chronic kidney disease.  Patient is insulin-dependent  type II diabetic.  P 69-year-old male with notable several week history of progressive onset worsening right lateral epicondylitis      There is no problem list on file for this patient.    History reviewed. No pertinent past medical history.  History reviewed. No pertinent surgical history.  Social History     Occupational History    Not on file   Tobacco Use    Smoking status: Former     Types: Cigarettes    Smokeless tobacco: Current   Vaping Use    Vaping status: Never Used   Substance and Sexual Activity    Alcohol use: Yes     Comment: occasionally    Drug use: Never    Sexual activity: Defer    Demond Valdez  reports that he has quit smoking. His smoking use included cigarettes. He uses smokeless tobacco. I have educated him on the risk of diseases from using tobacco products such as cancer, COPD, and heart disease.        Social History     Social History Narrative    Not on file     History reviewed. No pertinent family history.  Current Outpatient Medications   Medication Sig Dispense Refill    aspirin 81 MG chewable tablet Chew 1 tablet Daily.      Fiasp FlexTouch 100 UNIT/ML solution pen-injector INJECT 10 UNITS SUB-Q FOR  "BREAKFAST AND 10 UNITS NIGHTLY      glyburide (DIAbeta) 5 MG tablet Take 1 tablet by mouth Daily With Breakfast.      Jardiance 10 MG tablet tablet Take 1 tablet by mouth Daily.      lisinopril (PRINIVIL,ZESTRIL) 2.5 MG tablet Take 1 tablet by mouth Daily.      pantoprazole (PROTONIX) 40 MG EC tablet Take 1 tablet by mouth Daily.      pravastatin (PRAVACHOL) 40 MG tablet Take 1 tablet by mouth Every Night.      tadalafil (CIALIS) 20 MG tablet Take 1 tablet by mouth Daily.      traMADol (ULTRAM) 50 MG tablet Take 1 tablet by mouth 3 times a day.      Tresiba FlexTouch 100 UNIT/ML solution pen-injector injection INJECT 25 UNITS SUBCUTANEOUSLY ONCE DAILY       No current facility-administered medications for this visit.     No Known Allergies         Review of Systems   Constitutional: Negative.   HENT: Negative.     Eyes: Negative.    Cardiovascular: Negative.    Respiratory: Negative.     Endocrine: Negative.    Hematologic/Lymphatic: Negative.    Skin: Negative.    Musculoskeletal:  Positive for joint pain and joint swelling.        Pertinent positives listed in HPI   Gastrointestinal: Negative.    Genitourinary:  Positive for frequency.   Neurological: Negative.    Psychiatric/Behavioral: Negative.     Allergic/Immunologic: Negative.          Objective      Vitals:    10/31/24 1315   Weight: 77.1 kg (169 lb 15.6 oz)   Height: 182.3 cm (71.77\")      BMI cannot be calculated due to outdated height or weight values.  Please input a current height/weight in Vitals and re-renter BMIFOLLOWUP in Note to pull in correct documentation based on BMI range.      Physical Exam  Vitals and nursing note reviewed.   Constitutional:       General: He is not in acute distress.     Appearance: Normal appearance. He is not ill-appearing.   HENT:      Head: Normocephalic and atraumatic.      Right Ear: External ear normal.      Left Ear: External ear normal.      Nose: Nose normal.      Mouth/Throat:      Mouth: Mucous membranes are " moist.      Pharynx: Oropharynx is clear.   Eyes:      Extraocular Movements: Extraocular movements intact.      Conjunctiva/sclera: Conjunctivae normal.      Pupils: Pupils are equal, round, and reactive to light.   Cardiovascular:      Rate and Rhythm: Normal rate.      Pulses: Normal pulses.   Pulmonary:      Effort: Pulmonary effort is normal.   Abdominal:      General: There is no distension.   Musculoskeletal:      Cervical back: Normal range of motion. No rigidity.      Comments: Examination today patient's right elbow reveals there is tenderness to palpation along the common extensor tendon and lateral epicondyle.  The patient has full range of motion with pain upon oppositional extension of the forearm.  Patient exhibits full flexion and extension of the wrist with no significant pain upon pronation or supination.  Neurovascular status grossly intact  upper extremity.   Skin:     General: Skin is warm and dry.      Capillary Refill: Capillary refill takes less than 2 seconds.   Neurological:      General: No focal deficit present.      Mental Status: He is alert and oriented to person, place, and time.   Psychiatric:         Mood and Affect: Mood normal.         Behavior: Behavior normal.                 Radiology:        XR Wrist 3+ View Right    Result Date: 10/17/2024    No acute findings in the right wrist.   This report was finalized on 10/17/2024 11:40 AM by Dr. Laz Ferguson MD.           Assessment/Plan        ICD-10-CM ICD-9-CM   1. Right wrist pain  M25.531 719.43         69-year-old male with several week history of progressive onset worsening right wrist osteoarthritis and resolving right lateral epicondylitis patient is unable to tolerate NSAIDs secondary to chronic kidney disease.  As result of this as well as continuation of pain and symptoms he will discontinue the wrist brace and implement a home strengthening and stabilization exercises.  The patient was provided today with a Medrol  Dosepak to be taken as directed.  Patient will follow up in 3 weeks for further evaluation.  No direct surgical indication                  This document was signed by Tadeo Huffman PA-C October 31, 2024    CC: Bony Moon MD      Dictated Utilizing Dragon Dictation:   Please note that portions of this note were completed with a voice recognition program.   Part of this note may be an electronic transcription/translation of spoken language to printed text using the Dragon Dictation System.

## 2024-11-14 ENCOUNTER — OFFICE VISIT (OUTPATIENT)
Dept: ORTHOPEDIC SURGERY | Facility: CLINIC | Age: 69
End: 2024-11-14
Payer: MEDICARE

## 2024-11-14 VITALS — WEIGHT: 169 LBS | HEIGHT: 72 IN | BODY MASS INDEX: 22.89 KG/M2

## 2024-11-14 DIAGNOSIS — M25.531 RIGHT WRIST PAIN: Primary | ICD-10-CM

## 2024-11-14 PROCEDURE — 99213 OFFICE O/P EST LOW 20 MIN: CPT | Performed by: PHYSICIAN ASSISTANT

## 2024-11-14 NOTE — PROGRESS NOTES
[facility] [place] Orthopaedic Surgery Established Patient Visit          Patient: Demond Valdez  YOB: 1955  Date of Encounter: 11/14/2024  PCP: Bony Moon MD      Subjective     Chief Complaint   Patient presents with    Right Wrist - Follow-up, Pain           History of Present Illness:     Demond Valdez is a 69 y.o. male presents today as result of several week history of progressive right elbow/wrist pain.  Patient has undergone therapy as well as cock up wrist brace immobilization with continuation of pain into the right wrist.  Patient reports diffuse pain with repetitive activity and notable swelling.  He has had resolution of his elbow pain.  Patient is unable to take NSAIDs secondary to chronic kidney disease.  Patient is insulin-dependent  type II diabetic.  Patient reports that with the Medrol Dosepak he did have some resolution of the swelling however the pain is still localized to the volar and ulnar aspects of the wrist.  He is having difficulty upon normal daily activities as result of this.         There is no problem list on file for this patient.    History reviewed. No pertinent past medical history.  History reviewed. No pertinent surgical history.  Social History     Occupational History    Not on file   Tobacco Use    Smoking status: Former     Types: Cigarettes    Smokeless tobacco: Current   Vaping Use    Vaping status: Never Used   Substance and Sexual Activity    Alcohol use: Yes     Comment: occasionally    Drug use: Never    Sexual activity: Defer    Demond Valdez  reports that he has quit smoking. His smoking use included cigarettes. He uses smokeless tobacco. I have educated him on the risk of diseases from using tobacco products such as cancer, COPD, and heart disease.        Social History     Social History Narrative    Not on file     History reviewed. No pertinent family history.  Current Outpatient Medications   Medication Sig Dispense Refill    aspirin 81  "MG chewable tablet Chew 1 tablet Daily.      Fiasp FlexTouch 100 UNIT/ML solution pen-injector INJECT 10 UNITS SUB-Q FOR BREAKFAST AND 10 UNITS NIGHTLY      glyburide (DIAbeta) 5 MG tablet Take 1 tablet by mouth Daily With Breakfast.      Jardiance 10 MG tablet tablet Take 1 tablet by mouth Daily.      lisinopril (PRINIVIL,ZESTRIL) 2.5 MG tablet Take 1 tablet by mouth Daily.      methylPREDNISolone (MEDROL) 4 MG dose pack Use as directed by package instructions 21 tablet 0    pantoprazole (PROTONIX) 40 MG EC tablet Take 1 tablet by mouth Daily.      pravastatin (PRAVACHOL) 40 MG tablet Take 1 tablet by mouth Every Night.      tadalafil (CIALIS) 20 MG tablet Take 1 tablet by mouth Daily.      traMADol (ULTRAM) 50 MG tablet Take 1 tablet by mouth 3 times a day.      Tresiba FlexTouch 100 UNIT/ML solution pen-injector injection INJECT 25 UNITS SUBCUTANEOUSLY ONCE DAILY       No current facility-administered medications for this visit.     No Known Allergies         Review of Systems   Constitutional: Negative.   HENT: Negative.     Eyes: Negative.    Cardiovascular: Negative.    Respiratory: Negative.     Endocrine: Negative.    Hematologic/Lymphatic: Negative.    Skin: Negative.    Musculoskeletal:  Positive for joint pain and joint swelling.        Pertinent positives listed in HPI   Gastrointestinal: Negative.    Genitourinary:  Positive for frequency.   Neurological: Negative.    Psychiatric/Behavioral: Negative.     Allergic/Immunologic: Negative.          Objective      Vitals:    11/14/24 1107   Weight: 76.7 kg (169 lb)   Height: 182.3 cm (71.77\")      BMI cannot be calculated due to outdated height or weight values.  Please input a current height/weight in Vitals and re-renter BMIFOLLOWUP in Note to pull in correct documentation based on BMI range.      Physical Exam  Vitals and nursing note reviewed.   Constitutional:       General: He is not in acute distress.     Appearance: Normal appearance. He is not " ill-appearing.   HENT:      Head: Normocephalic and atraumatic.      Right Ear: External ear normal.      Left Ear: External ear normal.      Nose: Nose normal.      Mouth/Throat:      Mouth: Mucous membranes are moist.      Pharynx: Oropharynx is clear.   Eyes:      Extraocular Movements: Extraocular movements intact.      Conjunctiva/sclera: Conjunctivae normal.      Pupils: Pupils are equal, round, and reactive to light.   Cardiovascular:      Rate and Rhythm: Normal rate.      Pulses: Normal pulses.   Pulmonary:      Effort: Pulmonary effort is normal.   Abdominal:      General: There is no distension.   Musculoskeletal:      Cervical back: Normal range of motion. No rigidity.      Comments: Examination today patient's right elbow reveals there is reduction of the previous tenderness to palpation along the common extensor tendon and lateral epicondyle.  The patient has full range of motion with pain upon oppositional extension of the forearm.  Patient exhibits full flexion and extension of the wrist with return of the radiocarpal and carpometacarpal joint pain and stiffness.  There is mild pain upon supination and ulnar deviation.  Neurovascular status grossly intact  upper extremity.   Skin:     General: Skin is warm and dry.      Capillary Refill: Capillary refill takes less than 2 seconds.   Neurological:      General: No focal deficit present.      Mental Status: He is alert and oriented to person, place, and time.   Psychiatric:         Mood and Affect: Mood normal.         Behavior: Behavior normal.                 Radiology:        XR Wrist 3+ View Right    Result Date: 10/17/2024    No acute findings in the right wrist.   This report was finalized on 10/17/2024 11:40 AM by Dr. Laz Ferguson MD.           Assessment/Plan        ICD-10-CM ICD-9-CM   1. 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  Secondary to the patient's absence of alleviation with continued conservative treatment including therapy, medication, bracing etc. the patient will undergo further diagnostic imaging MRI right wrist.  The patient will return back upon completion of this for further evaluation                This document was signed by Tadeo Huffman PA-C November 14, 2024    CC: Bony Moon MD      Dictated Utilizing Dragon Dictation:   Please note that portions of this note were completed with a voice recognition program.   Part of this note may be an electronic transcription/translation of spoken language to printed text using the Dragon Dictation System.

## 2024-11-25 ENCOUNTER — LAB (OUTPATIENT)
Dept: LAB | Facility: HOSPITAL | Age: 69
End: 2024-11-25
Payer: MEDICARE

## 2024-11-25 ENCOUNTER — TRANSCRIBE ORDERS (OUTPATIENT)
Dept: ADMINISTRATIVE | Facility: HOSPITAL | Age: 69
End: 2024-11-25
Payer: MEDICARE

## 2024-11-25 DIAGNOSIS — E78.49 OTHER HYPERLIPIDEMIA: ICD-10-CM

## 2024-11-25 DIAGNOSIS — Z12.5 ENCOUNTER FOR SCREENING FOR MALIGNANT NEOPLASM OF PROSTATE: ICD-10-CM

## 2024-11-25 DIAGNOSIS — N18.31 CHRONIC KIDNEY DISEASE, STAGE 3A: ICD-10-CM

## 2024-11-25 DIAGNOSIS — E29.1 TESTICULAR HYPOFUNCTION: ICD-10-CM

## 2024-11-25 DIAGNOSIS — E11.65 TYPE 2 DIABETES MELLITUS WITH HYPERGLYCEMIA, UNSPECIFIED WHETHER LONG TERM INSULIN USE: ICD-10-CM

## 2024-11-25 DIAGNOSIS — I10 ESSENTIAL (PRIMARY) HYPERTENSION: ICD-10-CM

## 2024-11-25 DIAGNOSIS — E11.21 TYPE 2 DIABETES MELLITUS WITH DIABETIC NEPHROPATHY, UNSPECIFIED WHETHER LONG TERM INSULIN USE: ICD-10-CM

## 2024-11-25 DIAGNOSIS — N18.31 CHRONIC KIDNEY DISEASE, STAGE 3A: Primary | ICD-10-CM

## 2024-11-25 LAB
ALBUMIN SERPL-MCNC: 4.4 G/DL (ref 3.5–5.2)
ALBUMIN UR-MCNC: 2.7 MG/DL
ALBUMIN/GLOB SERPL: 1.2 G/DL
ALP SERPL-CCNC: 84 U/L (ref 39–117)
ALT SERPL W P-5'-P-CCNC: 12 U/L (ref 1–41)
AMYLASE SERPL-CCNC: 40 U/L (ref 28–100)
ANION GAP SERPL CALCULATED.3IONS-SCNC: 15.6 MMOL/L (ref 5–15)
AST SERPL-CCNC: 18 U/L (ref 1–40)
BASOPHILS # BLD AUTO: 0.05 10*3/MM3 (ref 0–0.2)
BASOPHILS NFR BLD AUTO: 0.8 % (ref 0–1.5)
BILIRUB SERPL-MCNC: 0.5 MG/DL (ref 0–1.2)
BUN SERPL-MCNC: 21 MG/DL (ref 8–23)
BUN/CREAT SERPL: 13.5 (ref 7–25)
CALCIUM SPEC-SCNC: 9.5 MG/DL (ref 8.6–10.5)
CHLORIDE SERPL-SCNC: 99 MMOL/L (ref 98–107)
CHOLEST SERPL-MCNC: 224 MG/DL (ref 0–200)
CO2 SERPL-SCNC: 21.4 MMOL/L (ref 22–29)
CREAT SERPL-MCNC: 1.55 MG/DL (ref 0.76–1.27)
DEPRECATED RDW RBC AUTO: 43.9 FL (ref 37–54)
EGFRCR SERPLBLD CKD-EPI 2021: 48.5 ML/MIN/1.73
EOSINOPHIL # BLD AUTO: 0.18 10*3/MM3 (ref 0–0.4)
EOSINOPHIL NFR BLD AUTO: 2.9 % (ref 0.3–6.2)
ERYTHROCYTE [DISTWIDTH] IN BLOOD BY AUTOMATED COUNT: 13.4 % (ref 12.3–15.4)
GLOBULIN UR ELPH-MCNC: 3.6 GM/DL
GLUCOSE SERPL-MCNC: 135 MG/DL (ref 65–99)
HBA1C MFR BLD: 8.4 % (ref 4.8–5.6)
HCT VFR BLD AUTO: 38.8 % (ref 37.5–51)
HDLC SERPL-MCNC: 59 MG/DL (ref 40–60)
HGB BLD-MCNC: 12.9 G/DL (ref 13–17.7)
IMM GRANULOCYTES # BLD AUTO: 0.01 10*3/MM3 (ref 0–0.05)
IMM GRANULOCYTES NFR BLD AUTO: 0.2 % (ref 0–0.5)
LDLC SERPL CALC-MCNC: 146 MG/DL (ref 0–100)
LDLC/HDLC SERPL: 2.44 {RATIO}
LIPASE SERPL-CCNC: 17 U/L (ref 13–60)
LYMPHOCYTES # BLD AUTO: 1.29 10*3/MM3 (ref 0.7–3.1)
LYMPHOCYTES NFR BLD AUTO: 21.1 % (ref 19.6–45.3)
MCH RBC QN AUTO: 30 PG (ref 26.6–33)
MCHC RBC AUTO-ENTMCNC: 33.2 G/DL (ref 31.5–35.7)
MCV RBC AUTO: 90.2 FL (ref 79–97)
MONOCYTES # BLD AUTO: 0.46 10*3/MM3 (ref 0.1–0.9)
MONOCYTES NFR BLD AUTO: 7.5 % (ref 5–12)
NEUTROPHILS NFR BLD AUTO: 4.12 10*3/MM3 (ref 1.7–7)
NEUTROPHILS NFR BLD AUTO: 67.5 % (ref 42.7–76)
NRBC BLD AUTO-RTO: 0 /100 WBC (ref 0–0.2)
PLATELET # BLD AUTO: 229 10*3/MM3 (ref 140–450)
PMV BLD AUTO: 10.8 FL (ref 6–12)
POTASSIUM SERPL-SCNC: 4.6 MMOL/L (ref 3.5–5.2)
PROT SERPL-MCNC: 8 G/DL (ref 6–8.5)
PSA SERPL-MCNC: 0.27 NG/ML (ref 0–4)
RBC # BLD AUTO: 4.3 10*6/MM3 (ref 4.14–5.8)
SODIUM SERPL-SCNC: 136 MMOL/L (ref 136–145)
T-UPTAKE NFR SERPL: 0.81 TBI (ref 0.8–1.3)
T4 SERPL-MCNC: 8.62 MCG/DL (ref 4.5–11.7)
TESTOST SERPL-MCNC: 558 NG/DL (ref 193–740)
TRIGL SERPL-MCNC: 105 MG/DL (ref 0–150)
TSH SERPL DL<=0.05 MIU/L-ACNC: 1.52 UIU/ML (ref 0.27–4.2)
VLDLC SERPL-MCNC: 19 MG/DL (ref 5–40)
WBC NRBC COR # BLD AUTO: 6.11 10*3/MM3 (ref 3.4–10.8)

## 2024-11-25 PROCEDURE — 84403 ASSAY OF TOTAL TESTOSTERONE: CPT

## 2024-11-25 PROCEDURE — 82150 ASSAY OF AMYLASE: CPT

## 2024-11-25 PROCEDURE — 84436 ASSAY OF TOTAL THYROXINE: CPT

## 2024-11-25 PROCEDURE — 83690 ASSAY OF LIPASE: CPT

## 2024-11-25 PROCEDURE — 82043 UR ALBUMIN QUANTITATIVE: CPT

## 2024-11-25 PROCEDURE — 84479 ASSAY OF THYROID (T3 OR T4): CPT

## 2024-11-25 PROCEDURE — G0103 PSA SCREENING: HCPCS

## 2024-11-25 PROCEDURE — 80061 LIPID PANEL: CPT

## 2024-11-25 PROCEDURE — 84443 ASSAY THYROID STIM HORMONE: CPT

## 2024-11-25 PROCEDURE — 83036 HEMOGLOBIN GLYCOSYLATED A1C: CPT

## 2024-11-25 PROCEDURE — 85025 COMPLETE CBC W/AUTO DIFF WBC: CPT

## 2024-11-25 PROCEDURE — 80053 COMPREHEN METABOLIC PANEL: CPT

## 2024-11-25 PROCEDURE — 36415 COLL VENOUS BLD VENIPUNCTURE: CPT

## 2024-12-03 ENCOUNTER — HOSPITAL ENCOUNTER (OUTPATIENT)
Dept: MRI IMAGING | Facility: HOSPITAL | Age: 69
Discharge: HOME | End: 2024-12-03
Admitting: PHYSICIAN ASSISTANT
Payer: MEDICARE

## 2024-12-03 DIAGNOSIS — M25.531 RIGHT WRIST PAIN: ICD-10-CM

## 2024-12-03 PROCEDURE — 73221 MRI JOINT UPR EXTREM W/O DYE: CPT

## 2024-12-04 ENCOUNTER — OFFICE VISIT (OUTPATIENT)
Dept: ORTHOPEDIC SURGERY | Facility: CLINIC | Age: 69
End: 2024-12-04
Payer: MEDICARE

## 2024-12-04 VITALS — BODY MASS INDEX: 22.89 KG/M2 | WEIGHT: 169 LBS | HEIGHT: 72 IN

## 2024-12-04 DIAGNOSIS — M25.531 RIGHT WRIST PAIN: Primary | ICD-10-CM

## 2024-12-04 DIAGNOSIS — M77.11 RIGHT LATERAL EPICONDYLITIS: ICD-10-CM

## 2024-12-04 PROCEDURE — 99213 OFFICE O/P EST LOW 20 MIN: CPT | Performed by: PHYSICIAN ASSISTANT

## 2024-12-04 NOTE — PROGRESS NOTES
[facility] [place] Orthopaedic Surgery Established Patient Visit          Patient: Demond Valdez  YOB: 1955  Date of Encounter: 12/4/2024  PCP: Bony Moon MD      Subjective     Chief Complaint   Patient presents with    Right Wrist - Follow-up, Pain           History of Present Illness:     Demond Valdez is a 69 y.o. male presents today as result of several week history of progressive right elbow/wrist pain.  Patient has undergone therapy as well as cock up wrist brace immobilization with continuation of pain into the right wrist.  Patient reports diffuse pain with repetitive activity and notable swelling.  He has had resolution of his elbow pain.  Patient is unable to take NSAIDs secondary to chronic kidney disease.  Patient is insulin-dependent  type II diabetic.  Patient reports that with the Medrol Dosepak he did have some resolution of the swelling however the pain is still localized to the volar and ulnar aspects of the wrist.  He is having difficulty upon normal daily activities as result of this.  Patient states the pain has improved since last office visit.  He presents today for MRI results review evaluation.        There is no problem list on file for this patient.    History reviewed. No pertinent past medical history.  History reviewed. No pertinent surgical history.  Social History     Occupational History    Not on file   Tobacco Use    Smoking status: Former     Types: Cigarettes    Smokeless tobacco: Current   Vaping Use    Vaping status: Never Used   Substance and Sexual Activity    Alcohol use: Yes     Comment: occasionally    Drug use: Never    Sexual activity: Defer    Demond Valdez  reports that he has quit smoking. His smoking use included cigarettes. He uses smokeless tobacco. I have educated him on the risk of diseases from using tobacco products such as cancer, COPD, and heart disease.        Social History     Social History Narrative    Not on file     History  "reviewed. No pertinent family history.  Current Outpatient Medications   Medication Sig Dispense Refill    aspirin 81 MG chewable tablet Chew 1 tablet Daily.      Fiasp FlexTouch 100 UNIT/ML solution pen-injector INJECT 10 UNITS SUB-Q FOR BREAKFAST AND 10 UNITS NIGHTLY      glyburide (DIAbeta) 5 MG tablet Take 1 tablet by mouth Daily With Breakfast.      Jardiance 10 MG tablet tablet Take 1 tablet by mouth Daily.      lisinopril (PRINIVIL,ZESTRIL) 2.5 MG tablet Take 1 tablet by mouth Daily.      methylPREDNISolone (MEDROL) 4 MG dose pack Use as directed by package instructions 21 tablet 0    pantoprazole (PROTONIX) 40 MG EC tablet Take 1 tablet by mouth Daily.      pravastatin (PRAVACHOL) 40 MG tablet Take 1 tablet by mouth Every Night.      tadalafil (CIALIS) 20 MG tablet Take 1 tablet by mouth Daily.      traMADol (ULTRAM) 50 MG tablet Take 1 tablet by mouth 3 times a day.      Tresiba FlexTouch 100 UNIT/ML solution pen-injector injection INJECT 25 UNITS SUBCUTANEOUSLY ONCE DAILY       No current facility-administered medications for this visit.     No Known Allergies         Review of Systems   Constitutional: Negative.   HENT: Negative.     Eyes: Negative.    Cardiovascular: Negative.    Respiratory: Negative.     Endocrine: Negative.    Hematologic/Lymphatic: Negative.    Skin: Negative.    Musculoskeletal:  Positive for joint pain and joint swelling.        Pertinent positives listed in HPI   Gastrointestinal: Negative.    Genitourinary:  Positive for frequency.   Neurological: Negative.    Psychiatric/Behavioral: Negative.     Allergic/Immunologic: Negative.          Objective      Vitals:    12/04/24 1459   Weight: 76.7 kg (169 lb)   Height: 182.3 cm (71.77\")      BMI cannot be calculated due to outdated height or weight values.  Please input a current height/weight in Vitals and re-renter BMIFOLLOWUP in Note to pull in correct documentation based on BMI range.      Physical Exam  Vitals and nursing note " reviewed.   Constitutional:       General: He is not in acute distress.     Appearance: Normal appearance. He is not ill-appearing.   HENT:      Head: Normocephalic and atraumatic.      Right Ear: External ear normal.      Left Ear: External ear normal.      Nose: Nose normal.      Mouth/Throat:      Mouth: Mucous membranes are moist.      Pharynx: Oropharynx is clear.   Eyes:      Extraocular Movements: Extraocular movements intact.      Conjunctiva/sclera: Conjunctivae normal.      Pupils: Pupils are equal, round, and reactive to light.   Cardiovascular:      Rate and Rhythm: Normal rate.      Pulses: Normal pulses.   Pulmonary:      Effort: Pulmonary effort is normal.   Abdominal:      General: There is no distension.   Musculoskeletal:      Cervical back: Normal range of motion. No rigidity.      Comments: Examination today patient's right elbow reveals there is reduction of the previous tenderness to palpation along the common extensor tendon and lateral epicondyle.  The patient has full range of motion with pain upon oppositional extension of the forearm.  Patient exhibits full flexion and extension of the wrist with return of the radiocarpal and carpometacarpal joint pain and stiffness.  There is mild pain upon supination and ulnar deviation.  Neurovascular status grossly intact  upper extremity.   Skin:     General: Skin is warm and dry.      Capillary Refill: Capillary refill takes less than 2 seconds.   Neurological:      General: No focal deficit present.      Mental Status: He is alert and oriented to person, place, and time.   Psychiatric:         Mood and Affect: Mood normal.         Behavior: Behavior normal.                 Radiology:        MRI Wrist Right Without Contrast    Result Date: 12/3/2024  Degenerative change at the radiocarpal and first carpometacarpal joint with no ligamentous or tendinous injury.  This report was finalized on 12/3/2024 4:42 PM by Oj Hall M.D..            Assessment/Plan        ICD-10-CM ICD-9-CM   1. Right wrist pain  M25.531 719.43   2. Right lateral epicondylitis  M77.11 726.32         69-year-old male with notable continued ongoing pain and symptoms into the right wrist with diffuse arthralgias and MRI results review revealing no ligamentous rupture.  There is notable degenerative change at the radiocarpal and first carpometacarpal joint.  The patient was instructed to continue with the home therapy exercises as well as activity modification and occasional bracing.  Patient was instructed to return back upon any significant complication or worsening/return of pain/symptoms.  We discussed the potential for radiocarpal intra-articular steroid injection.  Patient would like to forego this currently.  Follow-up when necessary.                    This document was signed by Tadeo Huffman PA-C December 4, 2024    CC: Bony Moon MD      Dictated Utilizing Dragon Dictation:   Please note that portions of this note were completed with a voice recognition program.   Part of this note may be an electronic transcription/translation of spoken language to printed text using the Dragon Dictation System.

## 2025-02-04 ENCOUNTER — OFFICE VISIT (OUTPATIENT)
Dept: ORTHOPEDIC SURGERY | Facility: CLINIC | Age: 70
End: 2025-02-04
Payer: MEDICARE

## 2025-02-04 VITALS — WEIGHT: 169 LBS | BODY MASS INDEX: 22.89 KG/M2 | HEIGHT: 72 IN

## 2025-02-04 DIAGNOSIS — M25.531 RIGHT WRIST PAIN: ICD-10-CM

## 2025-02-04 DIAGNOSIS — M18.11 PRIMARY OSTEOARTHRITIS OF FIRST CARPOMETACARPAL JOINT OF RIGHT HAND: Primary | ICD-10-CM

## 2025-02-04 DIAGNOSIS — M77.11 RIGHT LATERAL EPICONDYLITIS: ICD-10-CM

## 2025-02-04 PROCEDURE — 99213 OFFICE O/P EST LOW 20 MIN: CPT | Performed by: PHYSICIAN ASSISTANT

## 2025-02-04 NOTE — PROGRESS NOTES
[facility] [place] Patient Visit          Patient: Demond Valdez  YOB: 1955  Date of Encounter: 2/4/2025  PCP: Bony Moon MD      Subjective     Chief Complaint   Patient presents with    Right Wrist - Follow-up, Pain           History of Present Illness:     Demond Valdez is a 69 y.o. male presents today as result of ongoing right wrist/thumb pain.  Patient has undergone therapy as well as cock up wrist brace immobilization with previous improvement of pain into the right wrist.  Patient reports diffuse pain with repetitive activity 1st cmc joint with intermittent swelling.  He has had resolution of his elbow pain.  Patient is unable to take NSAIDs secondary to chronic kidney disease.  Patient is insulin-dependent  type II diabetic. He reports decreased swelling with usage of CBD gummies OTC. He is having mild pain upon normal daily repetitive activities.       There is no problem list on file for this patient.    History reviewed. No pertinent past medical history.  History reviewed. No pertinent surgical history.  Social History     Occupational History    Not on file   Tobacco Use    Smoking status: Former     Types: Cigarettes    Smokeless tobacco: Current   Vaping Use    Vaping status: Never Used   Substance and Sexual Activity    Alcohol use: Yes     Comment: occasionally    Drug use: Never    Sexual activity: Defer    Demond Valdez  reports that he has quit smoking. His smoking use included cigarettes. He uses smokeless tobacco. I have educated him on the risk of diseases from using tobacco products such as cancer, COPD, and heart disease.        Social History     Social History Narrative    Not on file     History reviewed. No pertinent family history.  Current Outpatient Medications   Medication Sig Dispense Refill    aspirin 81 MG chewable tablet Chew 1 tablet Daily.      Fiasp FlexTouch 100 UNIT/ML solution pen-injector INJECT 10 UNITS SUB-Q FOR  "BREAKFAST AND 10 UNITS NIGHTLY      glyburide (DIAbeta) 5 MG tablet Take 1 tablet by mouth Daily With Breakfast.      Jardiance 10 MG tablet tablet Take 1 tablet by mouth Daily.      lisinopril (PRINIVIL,ZESTRIL) 2.5 MG tablet Take 1 tablet by mouth Daily.      methylPREDNISolone (MEDROL) 4 MG dose pack Use as directed by package instructions 21 tablet 0    pantoprazole (PROTONIX) 40 MG EC tablet Take 1 tablet by mouth Daily.      pravastatin (PRAVACHOL) 40 MG tablet Take 1 tablet by mouth Every Night.      tadalafil (CIALIS) 20 MG tablet Take 1 tablet by mouth Daily.      traMADol (ULTRAM) 50 MG tablet Take 1 tablet by mouth 3 times a day.      Tresiba FlexTouch 100 UNIT/ML solution pen-injector injection INJECT 25 UNITS SUBCUTANEOUSLY ONCE DAILY       No current facility-administered medications for this visit.     No Known Allergies         Review of Systems   Constitutional: Negative.   HENT: Negative.     Eyes: Negative.    Cardiovascular: Negative.    Respiratory: Negative.     Endocrine: Negative.    Hematologic/Lymphatic: Negative.    Skin: Negative.    Musculoskeletal:  Positive for joint pain and joint swelling.        Pertinent positives listed in HPI   Gastrointestinal: Negative.    Genitourinary:  Positive for frequency.   Neurological: Negative.    Psychiatric/Behavioral: Negative.     Allergic/Immunologic: Negative.          Objective      Vitals:    02/04/25 0848   Weight: 76.7 kg (169 lb)   Height: 182.2 cm (71.75\")      BMI cannot be calculated due to outdated height or weight values.  Please input a current height/weight in Vitals and re-renter BMIFOLLOWUP in Note to pull in correct documentation based on BMI range.      Physical Exam  Vitals and nursing note reviewed.   Constitutional:       General: He is not in acute distress.     Appearance: Normal appearance. He is not ill-appearing.   HENT:      Head: Normocephalic and atraumatic.      Right Ear: External ear normal.      Left Ear: External " ear normal.      Nose: Nose normal.      Mouth/Throat:      Mouth: Mucous membranes are moist.      Pharynx: Oropharynx is clear.   Eyes:      Extraocular Movements: Extraocular movements intact.      Conjunctiva/sclera: Conjunctivae normal.      Pupils: Pupils are equal, round, and reactive to light.   Cardiovascular:      Rate and Rhythm: Normal rate.      Pulses: Normal pulses.   Pulmonary:      Effort: Pulmonary effort is normal.   Abdominal:      General: There is no distension.   Musculoskeletal:      Cervical back: Normal range of motion. No rigidity.      Comments: Examination today patient's right elbow reveals there is reduction of the previous tenderness to palpation along the common extensor tendon and lateral epicondyle.  The patient has full range of motion with pain upon oppositional extension of the forearm.  Patient exhibits full flexion and extension of the wrist with return of the radiocarpal and carpometacarpal joint pain and stiffness.  There is mild pain upon supination and ulnar deviation. Cmc joint pain with grind testing. Neurovascular status grossly intact  upper extremity.   Skin:     General: Skin is warm and dry.      Capillary Refill: Capillary refill takes less than 2 seconds.   Neurological:      General: No focal deficit present.      Mental Status: He is alert and oriented to person, place, and time.   Psychiatric:         Mood and Affect: Mood normal.         Behavior: Behavior normal.                 Radiology:        No radiology results for the last 30 days.        Assessment/Plan        ICD-10-CM ICD-9-CM   1. Primary osteoarthritis of first carpometacarpal joint of right hand  M18.11 715.14   2. Right lateral epicondylitis  M77.11 726.32   3. Right wrist pain  M25.531 719.43           69-year-old male with notable continued ongoing pain and symptoms into the right wrist with diffuse arthralgias and MRI results review revealing no ligamentous rupture.  There is notable  degenerative change at the radiocarpal and first carpometacarpal joint.  The patient was instructed to continue with the home therapy exercises as well as activity modification and occasional bracing.  Patient was instructed to return back upon any significant complication or worsening/return of pain/symptoms.  We discussed the potential for radiocarpal intra-articular steroid injection.  Patient would like to forego this currently.  Follow-up when necessary.                    This document was signed by Tadeo Hfufman PA-C February 4, 2025    CC: Bony Moon MD      Dictated Utilizing Dragon Dictation:   Please note that portions of this note were completed with a voice recognition program.   Part of this note may be an electronic transcription/translation of spoken language to printed text using the Dragon Dictation System.

## 2025-02-21 ENCOUNTER — LAB (OUTPATIENT)
Dept: LAB | Facility: HOSPITAL | Age: 70
End: 2025-02-21
Payer: MEDICARE

## 2025-02-21 ENCOUNTER — TRANSCRIBE ORDERS (OUTPATIENT)
Dept: ADMINISTRATIVE | Facility: HOSPITAL | Age: 70
End: 2025-02-21
Payer: MEDICARE

## 2025-02-21 DIAGNOSIS — E29.1 TESTICULAR HYPOFUNCTION: ICD-10-CM

## 2025-02-21 DIAGNOSIS — E78.49 OTHER HYPERLIPIDEMIA: ICD-10-CM

## 2025-02-21 DIAGNOSIS — N52.8 OTHER MALE ERECTILE DYSFUNCTION: ICD-10-CM

## 2025-02-21 DIAGNOSIS — N18.31 CHRONIC KIDNEY DISEASE, STAGE 3A: ICD-10-CM

## 2025-02-21 DIAGNOSIS — I10 ESSENTIAL (PRIMARY) HYPERTENSION: ICD-10-CM

## 2025-02-21 DIAGNOSIS — Z12.5 SPECIAL SCREENING FOR MALIGNANT NEOPLASM OF PROSTATE: ICD-10-CM

## 2025-02-21 DIAGNOSIS — N18.31 CHRONIC KIDNEY DISEASE, STAGE 3A: Primary | ICD-10-CM

## 2025-02-21 DIAGNOSIS — E11.21 TYPE II DIABETES MELLITUS WITH NEPHROPATHY: ICD-10-CM

## 2025-02-21 DIAGNOSIS — E11.65 TYPE 2 DIABETES MELLITUS WITH HYPERGLYCEMIA, UNSPECIFIED WHETHER LONG TERM INSULIN USE: ICD-10-CM

## 2025-02-21 LAB
ALBUMIN SERPL-MCNC: 4.3 G/DL (ref 3.5–5.2)
ALBUMIN/GLOB SERPL: 1.4 G/DL
ALP SERPL-CCNC: 75 U/L (ref 39–117)
ALT SERPL W P-5'-P-CCNC: 17 U/L (ref 1–41)
AMYLASE SERPL-CCNC: 42 U/L (ref 28–100)
ANION GAP SERPL CALCULATED.3IONS-SCNC: 10 MMOL/L (ref 5–15)
AST SERPL-CCNC: 21 U/L (ref 1–40)
BASOPHILS # BLD AUTO: 0.07 10*3/MM3 (ref 0–0.2)
BASOPHILS NFR BLD AUTO: 1.1 % (ref 0–1.5)
BILIRUB SERPL-MCNC: 0.4 MG/DL (ref 0–1.2)
BUN SERPL-MCNC: 17 MG/DL (ref 8–23)
BUN/CREAT SERPL: 12.4 (ref 7–25)
CALCIUM SPEC-SCNC: 9.3 MG/DL (ref 8.6–10.5)
CHLORIDE SERPL-SCNC: 98 MMOL/L (ref 98–107)
CHOLEST SERPL-MCNC: 213 MG/DL (ref 0–200)
CO2 SERPL-SCNC: 28 MMOL/L (ref 22–29)
CREAT SERPL-MCNC: 1.37 MG/DL (ref 0.76–1.27)
DEPRECATED RDW RBC AUTO: 42.3 FL (ref 37–54)
EGFRCR SERPLBLD CKD-EPI 2021: 56.2 ML/MIN/1.73
EOSINOPHIL # BLD AUTO: 0.18 10*3/MM3 (ref 0–0.4)
EOSINOPHIL NFR BLD AUTO: 2.9 % (ref 0.3–6.2)
ERYTHROCYTE [DISTWIDTH] IN BLOOD BY AUTOMATED COUNT: 12.6 % (ref 12.3–15.4)
GLOBULIN UR ELPH-MCNC: 3 GM/DL
GLUCOSE SERPL-MCNC: 64 MG/DL (ref 65–99)
HBA1C MFR BLD: 8.3 % (ref 4.8–5.6)
HCT VFR BLD AUTO: 42.4 % (ref 37.5–51)
HDLC SERPL-MCNC: 65 MG/DL (ref 40–60)
HGB BLD-MCNC: 13.8 G/DL (ref 13–17.7)
IMM GRANULOCYTES # BLD AUTO: 0.01 10*3/MM3 (ref 0–0.05)
IMM GRANULOCYTES NFR BLD AUTO: 0.2 % (ref 0–0.5)
LDLC SERPL CALC-MCNC: 132 MG/DL (ref 0–100)
LDLC/HDLC SERPL: 2 {RATIO}
LIPASE SERPL-CCNC: 17 U/L (ref 13–60)
LYMPHOCYTES # BLD AUTO: 2.14 10*3/MM3 (ref 0.7–3.1)
LYMPHOCYTES NFR BLD AUTO: 34.2 % (ref 19.6–45.3)
MCH RBC QN AUTO: 29.7 PG (ref 26.6–33)
MCHC RBC AUTO-ENTMCNC: 32.5 G/DL (ref 31.5–35.7)
MCV RBC AUTO: 91.2 FL (ref 79–97)
MONOCYTES # BLD AUTO: 0.47 10*3/MM3 (ref 0.1–0.9)
MONOCYTES NFR BLD AUTO: 7.5 % (ref 5–12)
NEUTROPHILS NFR BLD AUTO: 3.39 10*3/MM3 (ref 1.7–7)
NEUTROPHILS NFR BLD AUTO: 54.1 % (ref 42.7–76)
NRBC BLD AUTO-RTO: 0 /100 WBC (ref 0–0.2)
PLATELET # BLD AUTO: 228 10*3/MM3 (ref 140–450)
PMV BLD AUTO: 11.7 FL (ref 6–12)
POTASSIUM SERPL-SCNC: 4 MMOL/L (ref 3.5–5.2)
PROT SERPL-MCNC: 7.3 G/DL (ref 6–8.5)
PSA SERPL-MCNC: 0.29 NG/ML (ref 0–4)
RBC # BLD AUTO: 4.65 10*6/MM3 (ref 4.14–5.8)
SODIUM SERPL-SCNC: 136 MMOL/L (ref 136–145)
T-UPTAKE NFR SERPL: 0.82 TBI (ref 0.8–1.3)
T3 SERPL-MCNC: 97.7 NG/DL (ref 80–200)
T4 SERPL-MCNC: 8.06 MCG/DL (ref 4.5–11.7)
T4 SERPL-MCNC: 8.06 MCG/DL (ref 4.5–11.7)
TRIGL SERPL-MCNC: 91 MG/DL (ref 0–150)
TSH SERPL DL<=0.05 MIU/L-ACNC: 2.35 UIU/ML (ref 0.27–4.2)
VLDLC SERPL-MCNC: 16 MG/DL (ref 5–40)
WBC NRBC COR # BLD AUTO: 6.26 10*3/MM3 (ref 3.4–10.8)

## 2025-02-21 PROCEDURE — G0103 PSA SCREENING: HCPCS

## 2025-02-21 PROCEDURE — 82570 ASSAY OF URINE CREATININE: CPT

## 2025-02-21 PROCEDURE — 84479 ASSAY OF THYROID (T3 OR T4): CPT

## 2025-02-21 PROCEDURE — 84403 ASSAY OF TOTAL TESTOSTERONE: CPT

## 2025-02-21 PROCEDURE — 80061 LIPID PANEL: CPT

## 2025-02-21 PROCEDURE — 84402 ASSAY OF FREE TESTOSTERONE: CPT

## 2025-02-21 PROCEDURE — 83690 ASSAY OF LIPASE: CPT

## 2025-02-21 PROCEDURE — 84443 ASSAY THYROID STIM HORMONE: CPT

## 2025-02-21 PROCEDURE — 84436 ASSAY OF TOTAL THYROXINE: CPT

## 2025-02-21 PROCEDURE — 36415 COLL VENOUS BLD VENIPUNCTURE: CPT

## 2025-02-21 PROCEDURE — 83036 HEMOGLOBIN GLYCOSYLATED A1C: CPT

## 2025-02-21 PROCEDURE — 82043 UR ALBUMIN QUANTITATIVE: CPT

## 2025-02-21 PROCEDURE — 80053 COMPREHEN METABOLIC PANEL: CPT

## 2025-02-21 PROCEDURE — 82150 ASSAY OF AMYLASE: CPT

## 2025-02-21 PROCEDURE — 85025 COMPLETE CBC W/AUTO DIFF WBC: CPT

## 2025-02-21 PROCEDURE — 84480 ASSAY TRIIODOTHYRONINE (T3): CPT

## 2025-02-22 LAB
ALBUMIN UR-MCNC: 3.4 MG/DL
CREAT UR-MCNC: 97 MG/DL
MICROALBUMIN/CREAT UR: 35.1 MG/G (ref 0–29)

## 2025-02-26 LAB
TESTOST FREE SERPL-MCNC: 5.8 PG/ML (ref 6.6–18.1)
TESTOST SERPL-MCNC: 640 NG/DL (ref 264–916)